# Patient Record
Sex: FEMALE | Race: WHITE | NOT HISPANIC OR LATINO | Employment: FULL TIME | URBAN - METROPOLITAN AREA
[De-identification: names, ages, dates, MRNs, and addresses within clinical notes are randomized per-mention and may not be internally consistent; named-entity substitution may affect disease eponyms.]

---

## 2017-01-30 ENCOUNTER — OFFICE VISIT - HEALTHEAST (OUTPATIENT)
Dept: FAMILY MEDICINE | Facility: CLINIC | Age: 37
End: 2017-01-30

## 2017-01-30 DIAGNOSIS — E28.2 POLYCYSTIC OVARIAN SYNDROME: ICD-10-CM

## 2017-01-30 DIAGNOSIS — E66.01 MORBID OBESITY WITH BMI OF 40.0-44.9, ADULT (H): ICD-10-CM

## 2017-01-30 DIAGNOSIS — Z00.00 ROUTINE GENERAL MEDICAL EXAMINATION AT A HEALTH CARE FACILITY: ICD-10-CM

## 2017-01-30 DIAGNOSIS — F42.9 OBSESSIVE-COMPULSIVE DISORDER: ICD-10-CM

## 2017-01-30 DIAGNOSIS — F33.0 MAJOR DEPRESSIVE DISORDER, RECURRENT EPISODE, MILD (H): ICD-10-CM

## 2017-01-30 DIAGNOSIS — M62.830 MUSCLE SPASM OF BACK: ICD-10-CM

## 2017-01-30 DIAGNOSIS — F41.1 GENERALIZED ANXIETY DISORDER: ICD-10-CM

## 2017-01-30 ASSESSMENT — MIFFLIN-ST. JEOR: SCORE: 2049.91

## 2017-03-19 ENCOUNTER — COMMUNICATION - HEALTHEAST (OUTPATIENT)
Dept: FAMILY MEDICINE | Facility: CLINIC | Age: 37
End: 2017-03-19

## 2017-03-19 DIAGNOSIS — F33.0 MAJOR DEPRESSIVE DISORDER, RECURRENT EPISODE, MILD (H): ICD-10-CM

## 2017-03-19 DIAGNOSIS — F41.1 GENERALIZED ANXIETY DISORDER: ICD-10-CM

## 2017-03-19 DIAGNOSIS — F42.9 OBSESSIVE-COMPULSIVE DISORDER: ICD-10-CM

## 2018-02-03 ENCOUNTER — COMMUNICATION - HEALTHEAST (OUTPATIENT)
Dept: FAMILY MEDICINE | Facility: CLINIC | Age: 38
End: 2018-02-03

## 2018-02-03 DIAGNOSIS — E28.2 POLYCYSTIC OVARIAN SYNDROME: ICD-10-CM

## 2018-06-29 ENCOUNTER — COMMUNICATION - HEALTHEAST (OUTPATIENT)
Dept: FAMILY MEDICINE | Facility: CLINIC | Age: 38
End: 2018-06-29

## 2018-06-29 DIAGNOSIS — F33.0 MAJOR DEPRESSIVE DISORDER, RECURRENT EPISODE, MILD (H): ICD-10-CM

## 2018-06-29 DIAGNOSIS — F41.1 GENERALIZED ANXIETY DISORDER: ICD-10-CM

## 2018-06-29 DIAGNOSIS — F42.9 OBSESSIVE-COMPULSIVE DISORDER: ICD-10-CM

## 2018-08-16 ENCOUNTER — OFFICE VISIT - HEALTHEAST (OUTPATIENT)
Dept: FAMILY MEDICINE | Facility: CLINIC | Age: 38
End: 2018-08-16

## 2018-08-16 DIAGNOSIS — Z00.00 ROUTINE GENERAL MEDICAL EXAMINATION AT A HEALTH CARE FACILITY: ICD-10-CM

## 2018-08-16 DIAGNOSIS — E28.2 POLYCYSTIC OVARIAN SYNDROME: ICD-10-CM

## 2018-08-16 DIAGNOSIS — N91.1 SECONDARY AMENORRHEA: ICD-10-CM

## 2018-08-16 DIAGNOSIS — F42.9 OBSESSIVE-COMPULSIVE DISORDER: ICD-10-CM

## 2018-08-16 DIAGNOSIS — F41.1 GENERALIZED ANXIETY DISORDER: ICD-10-CM

## 2018-08-16 DIAGNOSIS — E66.01 MORBID OBESITY WITH BMI OF 40.0-44.9, ADULT (H): ICD-10-CM

## 2018-08-16 DIAGNOSIS — M62.830 MUSCLE SPASM OF BACK: ICD-10-CM

## 2018-08-16 DIAGNOSIS — F33.0 MAJOR DEPRESSIVE DISORDER, RECURRENT EPISODE, MILD (H): ICD-10-CM

## 2018-08-16 DIAGNOSIS — L30.9 ECZEMA: ICD-10-CM

## 2018-08-16 LAB
CHOLEST SERPL-MCNC: 215 MG/DL
FASTING STATUS PATIENT QL REPORTED: YES
HBA1C MFR BLD: 5.3 % (ref 3.5–6)
HDLC SERPL-MCNC: 44 MG/DL
LDLC SERPL CALC-MCNC: 139 MG/DL
TRIGL SERPL-MCNC: 159 MG/DL

## 2018-08-16 RX ORDER — TRIAMCINOLONE ACETONIDE 1 MG/G
CREAM TOPICAL 2 TIMES DAILY PRN
Qty: 80 G | Refills: 1 | Status: SHIPPED | OUTPATIENT
Start: 2018-08-16 | End: 2022-07-02

## 2018-08-16 ASSESSMENT — MIFFLIN-ST. JEOR: SCORE: 2044.81

## 2019-04-08 ENCOUNTER — COMMUNICATION - HEALTHEAST (OUTPATIENT)
Dept: FAMILY MEDICINE | Facility: CLINIC | Age: 39
End: 2019-04-08

## 2019-09-29 ENCOUNTER — COMMUNICATION - HEALTHEAST (OUTPATIENT)
Dept: FAMILY MEDICINE | Facility: CLINIC | Age: 39
End: 2019-09-29

## 2019-09-29 DIAGNOSIS — F41.1 GENERALIZED ANXIETY DISORDER: ICD-10-CM

## 2019-09-29 DIAGNOSIS — F42.9 OBSESSIVE-COMPULSIVE DISORDER: ICD-10-CM

## 2019-09-29 DIAGNOSIS — F33.0 MAJOR DEPRESSIVE DISORDER, RECURRENT EPISODE, MILD (H): ICD-10-CM

## 2019-11-11 ENCOUNTER — COMMUNICATION - HEALTHEAST (OUTPATIENT)
Dept: FAMILY MEDICINE | Facility: CLINIC | Age: 39
End: 2019-11-11

## 2019-11-11 DIAGNOSIS — E28.2 POLYCYSTIC OVARIAN SYNDROME: ICD-10-CM

## 2019-12-27 ENCOUNTER — OFFICE VISIT - HEALTHEAST (OUTPATIENT)
Dept: FAMILY MEDICINE | Facility: CLINIC | Age: 39
End: 2019-12-27

## 2019-12-27 DIAGNOSIS — Z12.31 VISIT FOR SCREENING MAMMOGRAM: ICD-10-CM

## 2019-12-27 DIAGNOSIS — E66.9 OBESITY (BMI 35.0-39.9 WITHOUT COMORBIDITY): ICD-10-CM

## 2019-12-27 DIAGNOSIS — F32.5 DEPRESSION, MAJOR, IN REMISSION (H): ICD-10-CM

## 2019-12-27 DIAGNOSIS — E66.01 MORBID OBESITY WITH BMI OF 40.0-44.9, ADULT (H): ICD-10-CM

## 2019-12-27 DIAGNOSIS — Z00.00 ROUTINE GENERAL MEDICAL EXAMINATION AT A HEALTH CARE FACILITY: ICD-10-CM

## 2019-12-27 DIAGNOSIS — F42.9 OBSESSIVE-COMPULSIVE DISORDER: ICD-10-CM

## 2019-12-27 DIAGNOSIS — F41.1 GENERALIZED ANXIETY DISORDER: ICD-10-CM

## 2019-12-27 DIAGNOSIS — Z12.4 CERVICAL CANCER SCREENING: ICD-10-CM

## 2019-12-27 DIAGNOSIS — F42.9 OBSESSIVE-COMPULSIVE DISORDER, UNSPECIFIED TYPE: ICD-10-CM

## 2019-12-27 DIAGNOSIS — E28.2 POLYCYSTIC OVARIAN SYNDROME: ICD-10-CM

## 2019-12-27 DIAGNOSIS — Z00.00 HEALTH CARE MAINTENANCE: ICD-10-CM

## 2019-12-27 DIAGNOSIS — E78.5 DYSLIPIDEMIA (HIGH LDL; LOW HDL): ICD-10-CM

## 2019-12-27 DIAGNOSIS — F33.0 MAJOR DEPRESSIVE DISORDER, RECURRENT EPISODE, MILD (H): ICD-10-CM

## 2019-12-27 LAB
CHOLEST SERPL-MCNC: 209 MG/DL
CLUE CELLS: NORMAL
FASTING STATUS PATIENT QL REPORTED: ABNORMAL
HBA1C MFR BLD: 5.2 % (ref 3.5–6)
HDLC SERPL-MCNC: 55 MG/DL
LDLC SERPL CALC-MCNC: 121 MG/DL
TRICHOMONAS, WET PREP: NORMAL
TRIGL SERPL-MCNC: 167 MG/DL
YEAST, WET PREP: NORMAL

## 2019-12-27 ASSESSMENT — PATIENT HEALTH QUESTIONNAIRE - PHQ9: SUM OF ALL RESPONSES TO PHQ QUESTIONS 1-9: 2

## 2019-12-27 ASSESSMENT — MIFFLIN-ST. JEOR: SCORE: 1895.69

## 2019-12-30 LAB
C TRACH DNA SPEC QL PROBE+SIG AMP: NEGATIVE
HBV SURFACE AG SERPL QL IA: NEGATIVE
HEPATITIS B SURFACE ANTIBODY LHE- HISTORICAL: NEGATIVE
HPV SOURCE: NORMAL
HUMAN PAPILLOMA VIRUS 16 DNA: NEGATIVE
HUMAN PAPILLOMA VIRUS 18 DNA: NEGATIVE
HUMAN PAPILLOMA VIRUS FINAL DIAGNOSIS: NORMAL
HUMAN PAPILLOMA VIRUS OTHER HR: NEGATIVE
N GONORRHOEA DNA SPEC QL NAA+PROBE: NEGATIVE
SPECIMEN DESCRIPTION: NORMAL

## 2019-12-31 ENCOUNTER — COMMUNICATION - HEALTHEAST (OUTPATIENT)
Dept: FAMILY MEDICINE | Facility: CLINIC | Age: 39
End: 2019-12-31

## 2020-02-12 ENCOUNTER — VIRTUAL VISIT (OUTPATIENT)
Dept: FAMILY MEDICINE | Facility: OTHER | Age: 40
End: 2020-02-12

## 2020-02-13 NOTE — PROGRESS NOTES
"Date: 2020 17:43:12  Clinician: Will Ervin  Clinician NPI: [id number]  Patient: ADAN PEREZ  Patient : 1980  Patient Address: [facility] [place] [address]  Patient Phone: [phone]  Visit Protocol: URI  Patient Summary:  ADAN is a 39 year old ( : 1980 ) female who initiated a Visit for cold, sinus infection, or influenza. When asked the question \"Please sign me up to receive news, health information and promotions from [employer].\", ADAN responded \"No\".    ADAN states her symptoms started gradually 7-9 days ago.   Her symptoms consist of a cough, facial pain or pressure, ear pain, enlarged lymph nodes, malaise, nasal congestion, and tooth pain.   Symptom details     Nasal secretions: The color of her mucus is green, clear, and yellow.    Cough: ADAN coughs every 5-10 minutes and her cough is more bothersome at night. Phlegm comes into her throat when she coughs. She believes the phlegm causes the cough. The color of the phlegm is yellow.     Facial pain or pressure: The facial pain or pressure feels worse when bending over or leaning forward.     Tooth pain: The tooth pain is not caused by a cavity, recent dental work, or other mouth problems.      ADAN denies having myalgias, headache, sore throat, chills, fever, rhinitis, and wheezing. She also denies having recent facial or sinus surgery in the past 60 days, double sickening (worsening symptoms after initial improvement), and taking antibiotic medication for the symptoms. She is not experiencing dyspnea.   Precipitating events  She has not recently been exposed to someone with influenza. ADAN has not been in close contact with any high risk individuals.   ADAN has not traveled internationally in the last 14 days before the start of her symptoms.   Pertinent medical history  ADAN had 1 sinus infection within the past year.   ADAN does not get yeast infections when she takes antibiotics.   Weight: 250 lbs   ADAN does not smoke " or use smokeless tobacco.   She denies pregnancy and denies breastfeeding. She is currently menstruating.   Weight: 250 lbs    MEDICATIONS: levonorgestrel-ethinyl estradiol oral, sertraline oral, ALLERGIES: NKDA  Clinician Response:  Dear ADAN,  Based on the information provided, you have a viral upper respiratory infection, otherwise known as a cold. Symptoms vary from person to person, but can include sneezing, coughing, a runny nose, sore throat, and headache and range from mild to severe.  Unfortunately, there are no medications that can cure a cold, so treatment is focused on controlling symptoms as much as possible. Most people gradually feel better until symptoms are gone in 1-2 weeks.  Based on the information provided, you have acute bacterial sinusitis, also known as a sinus infection. Sinus infections are caused by bacteria or a virus and symptoms are almost always identical. The difference between the 2 types of infections is timing.  Sinus infections start as viral infections and symptoms improve on their own in about 7 days. If symptoms have not improved after 7 days or have even worsened, a bacterial infection may have developed.  Medication information  Because you have a viral infection, antibiotics will not help you get better. Treating a viral infection with antibiotics could actually make you feel worse.  I am prescribing:       Fluticasone 50 mcg/actuation nasal spray. Inhale 2 sprays in each nostril 1 time per day; after 1 week, may adjust to 1 - 2 sprays in each nostril 1 time per day. This medication takes several days to start working, so keep taking it even if it doesn't help right away. There are no refills with this prescription.      Amoxicillin-pot clavulanate 875-125 mg oral tablet. Take 1 tablet by mouth every 12 hours for 7 days. There are no refills with this prescription.      Benzonatate (Tessalon Perles) 100 mg oral capsule. Take 1-2 capsules by mouth 3 times per day as needed  for your cough. There are no refills with this prescription.     Yeast infections can be a common side effect of antibiotics. The most common symptom of a yeast infection is itchiness in and around the vagina. Other signs and symptoms include burning, redness, or a thick, white vaginal discharge that looks like cottage cheese and does not have a bad smell.  If you become pregnant during this course of treatment, stop taking the medication and contact your primary care provider.  Unless you are allergic to the over-the-counter medication(s) below, I recommend using:       Guaifenesin + dextromethorphan (Robitussin DM, Mucinex DM, or store brand).    A sinus irrigation kit such as Sinus Rinse, Neti Pot, SinuCleanse, or store brand. Be sure to use sterile or previously boiled water to prevent unwanted infections.     Over-the-counter medications do not require a prescription. Ask the pharmacist if you have any questions.  Self care  The following tips will keep you as comfortable as possible while you recover:     Rest    Drink plenty of water and other liquids    Take a hot shower to loosen congestion    Take a spoonful of honey to reduce your cough     When to seek care  Please be seen in a clinic or urgent care if any of the following occur:     Symptoms do not start to improve after 3 days of treatment    New symptoms develop, or symptoms become worse     It is possible to have an allergic reaction to an antibiotic even if you have not had one in the past. If you notice a new rash, significant swelling, or difficulty breathing, stop taking this medication immediately and go to a clinic or urgent care.   Diagnosis: Viral URI  Diagnosis ICD: J06.9  Prescription: fluticasone 50 mcg/actuation nasal spray,suspension 1 120 spray aerosol with adapter (grams), 30 days supply. Inhale 2 sprays in each nostril 1 time per day; after 1 week, may adjust to 1 - 2 sprays in each nostril 1 time per day.. Refills: 0, Refill as  needed: no, Allow substitutions: yes  Prescription: benzonatate (Tessalon Perles) 100 mg oral capsule 30 capsule, 5 days supply. Take 1-2 capsules by mouth 3 times per day as needed. Refills: 0, Refill as needed: no, Allow substitutions: yes  Prescription: amoxicillin-pot clavulanate 875-125 mg oral tablet 14 tablet, 7 days supply. Take 1 tablet by mouth every 12 hours for 7 days. Refills: 0, Refill as needed: no, Allow substitutions: yes  Pharmacy: [id number] - [phone] - [address], [place] [address]

## 2020-02-26 ENCOUNTER — HOSPITAL ENCOUNTER (OUTPATIENT)
Dept: MAMMOGRAPHY | Facility: CLINIC | Age: 40
Discharge: HOME | End: 2020-02-26
Attending: FAMILY MEDICINE

## 2020-02-26 DIAGNOSIS — Z12.31 VISIT FOR SCREENING MAMMOGRAM: ICD-10-CM

## 2021-02-03 ENCOUNTER — COMMUNICATION - HEALTHEAST (OUTPATIENT)
Dept: FAMILY MEDICINE | Facility: CLINIC | Age: 41
End: 2021-02-03

## 2021-02-03 DIAGNOSIS — E28.2 POLYCYSTIC OVARIAN SYNDROME: ICD-10-CM

## 2021-02-04 RX ORDER — LEVONORGESTREL AND ETHINYL ESTRADIOL 0.15-0.03
KIT ORAL
Qty: 91 TABLET | Refills: 4 | Status: SHIPPED | OUTPATIENT
Start: 2021-02-04 | End: 2022-03-22

## 2021-03-25 ENCOUNTER — COMMUNICATION - HEALTHEAST (OUTPATIENT)
Dept: FAMILY MEDICINE | Facility: CLINIC | Age: 41
End: 2021-03-25

## 2021-03-25 DIAGNOSIS — F33.0 MAJOR DEPRESSIVE DISORDER, RECURRENT EPISODE, MILD (H): ICD-10-CM

## 2021-03-25 DIAGNOSIS — F42.9 OBSESSIVE-COMPULSIVE DISORDER: ICD-10-CM

## 2021-03-25 DIAGNOSIS — F41.1 GENERALIZED ANXIETY DISORDER: ICD-10-CM

## 2021-03-26 RX ORDER — SERTRALINE HYDROCHLORIDE 100 MG/1
TABLET, FILM COATED ORAL
Qty: 180 TABLET | Refills: 4 | Status: SHIPPED | OUTPATIENT
Start: 2021-03-26 | End: 2022-06-14

## 2021-04-05 ENCOUNTER — COMMUNICATION - HEALTHEAST (OUTPATIENT)
Dept: FAMILY MEDICINE | Facility: CLINIC | Age: 41
End: 2021-04-05

## 2021-05-26 ASSESSMENT — PATIENT HEALTH QUESTIONNAIRE - PHQ9: SUM OF ALL RESPONSES TO PHQ QUESTIONS 1-9: 2

## 2021-05-30 ENCOUNTER — RECORDS - HEALTHEAST (OUTPATIENT)
Dept: ADMINISTRATIVE | Facility: CLINIC | Age: 41
End: 2021-05-30

## 2021-05-30 VITALS — HEIGHT: 69 IN | BODY MASS INDEX: 43.25 KG/M2 | WEIGHT: 292 LBS

## 2021-06-01 VITALS — HEIGHT: 69 IN | WEIGHT: 290 LBS | BODY MASS INDEX: 42.95 KG/M2

## 2021-06-01 NOTE — TELEPHONE ENCOUNTER
RN cannot approve Refill Request    RN can NOT refill this medication PCP messaged that patient is overdue for Office Visit. Last office visit: 11/10/2015 Brooke Pal MD Last Physical: 8/16/2018 Last MTM visit: Visit date not found Last visit same specialty: 11/10/2015 Brooke Pal MD.  Next visit within 3 mo: Visit date not found  Next physical within 3 mo: Visit date not found      Faiza Maynard, [facility] Triage/Med Refill 9/29/2019    Requested Prescriptions   Pending Prescriptions Disp Refills     sertraline (ZOLOFT) 100 MG tablet [Pharmacy Med Name: SERTRALINE  MG TABLET] 180 tablet 4     Sig: TAKE 2 TABLETS BY MOUTH EVERY DAY       [facility] Refill Protocol  Failed - 9/29/2019 12:29 AM        Failed - PCP or prescribing provider visit in last year     Last office visit with prescriber/PCP: 11/10/2015 Brooke Pal MD OR same dept: Visit date not found OR same specialty: 11/10/2015 Brooke Pal MD  Last physical: 8/16/2018 Last MTM visit: Visit date not found   Next visit within 3 mo: Visit date not found  Next physical within 3 mo: Visit date not found  Prescriber OR PCP: Brooke Pal MD  Last diagnosis associated with med order: 1. Obsessive-compulsive disorder  - sertraline (ZOLOFT) 100 MG tablet [Pharmacy Med Name: SERTRALINE  MG TABLET]; TAKE 2 TABLETS BY MOUTH EVERY DAY  Dispense: 180 tablet; Refill: 4    2. Mild Recurrent Major Depression  - sertraline (ZOLOFT) 100 MG tablet [Pharmacy Med Name: SERTRALINE  MG TABLET]; TAKE 2 TABLETS BY MOUTH EVERY DAY  Dispense: 180 tablet; Refill: 4    3. Generalized anxiety disorder  - sertraline (ZOLOFT) 100 MG tablet [Pharmacy Med Name: SERTRALINE  MG TABLET]; TAKE 2 TABLETS BY MOUTH EVERY DAY  Dispense: 180 tablet; Refill: 4    If protocol passes may refill for 12 months if within 3 months of last provider visit (or a total of 15 months).

## 2021-06-02 NOTE — TELEPHONE ENCOUNTER
Left message #1 at [phone] for patient to call clinic to schedule annual physical with PCP. Postponing task out to a week and will try again.

## 2021-06-03 NOTE — TELEPHONE ENCOUNTER
RN cannot approve Refill Request    RN can NOT refill this medication PCP messaged that patient is overdue for Office Visit. Last office visit: 11/10/2015 Brooke Pal MD Last Physical: 8/16/2018 Last MTM visit: Visit date not found Last visit same specialty: 11/10/2015 Brooke Pal MD.  Next visit within 3 mo: Visit date not found  Next physical within 3 mo: Visit date not found      Ruby Gu, [facility] Triage/Med Refill 11/11/2019    Requested Prescriptions   Pending Prescriptions Disp Refills     levonorgestrel-ethinyl estradiol (SEASONALE) 0.15 mg-30 mcg (91) per tablet [Pharmacy Med Name: LEVONOR-ETH ESTRAD 0.15-0.03] 91 tablet 3     Sig: TAKE 1 TABLET BY MOUTH EVERY DAY       Oral Contraceptives Protocol Failed - 11/11/2019 12:34 PM        Failed - Visit with PCP or prescribing provider visit in last 12 months      Last office visit with prescriber/PCP: 11/10/2015 Brooke Pal MD OR same dept: Visit date not found OR same specialty: 11/10/2015 Brooke Pal MD  Last physical: 8/16/2018 Last MTM visit: Visit date not found   Next visit within 3 mo: Visit date not found  Next physical within 3 mo: Visit date not found  Prescriber OR PCP: Brooke Pal MD  Last diagnosis associated with med order: 1. Polycystic ovarian syndrome  - levonorgestrel-ethinyl estradiol (SEASONALE) 0.15 mg-30 mcg (91) per tablet [Pharmacy Med Name: LEVONOR-ETH ESTRAD 0.15-0.03]; TAKE 1 TABLET BY MOUTH EVERY DAY  Dispense: 91 tablet; Refill: 3    If protocol passes may refill for 12 months if within 3 months of last provider visit (or a total of 15 months).

## 2021-06-03 NOTE — TELEPHONE ENCOUNTER
Future Appointments   Date Time Provider Department Center   12/27/2019  1:00 PM Brooke Pal MD [facility]      Health Maintenance Due   Topic Date Due     DEPRESSION FOLLOW UP  1980     PAP SMEAR  07/08/2019     HPV TEST  07/08/2019     INFLUENZA VACCINE RULE BASED (1) 08/01/2019     PREVENTIVE CARE VISIT  08/16/2019

## 2021-06-04 VITALS
HEART RATE: 70 BPM | DIASTOLIC BLOOD PRESSURE: 72 MMHG | RESPIRATION RATE: 16 BRPM | WEIGHT: 258 LBS | SYSTOLIC BLOOD PRESSURE: 108 MMHG | BODY MASS INDEX: 38.21 KG/M2 | HEIGHT: 69 IN | TEMPERATURE: 97.9 F

## 2021-06-04 NOTE — PATIENT INSTRUCTIONS - HE
Cholesterol  Cooking oils: avocado or grapeseed  Raw: olive  Eat less animal fat and MORE plant fats (including nuts).  Eat more whole grains.

## 2021-06-04 NOTE — PROGRESS NOTES
Health Maintenance Exam  [facility]  Date of Service: 12/27/2019    Subjective   Germania Cheung is a 39 y.o. female who presents for a routine physical exam.     Current concerns include the following:    No concerns.  Needs refills on chronic medications.  She thinks she may not be immune to hepatitis B.  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      The 10-year ASCVD risk score (Demondabdulkadir MICHEL Jr., et al., 2013) is: 0.7%    Values used to calculate the score:      Age: 39 years      Sex: Female      Is Non- : No      Diabetic: No      Tobacco smoker: No      Systolic Blood Pressure: 108 mmHg      Is BP treated: No      HDL Cholesterol: 44 mg/dL      Total Cholesterol: 215 mg/dL      Active Non-Hospital Problems    Diagnosis     Obesity (BMI 35.0-39.9 without comorbidity)     Dyslipidemia (high LDL; low HDL)     2018       Polycystic ovarian syndrome     Secondary amenorrhea and signs of hyperandrogenism (acne and hair growth).  Hormone evaluation otherwise negative.  Has not had pelvic ultrasound.       Secondary amenorrhea     Muscle spasm of back     Depression, major, in remission (H)     Generalized anxiety disorder     Obsessive Compulsive Disorder     Eczema     Past Medical History:   Diagnosis Date     Nicotine dependence     7 pack years     Postpartum depression       Past Surgical History:   Procedure Laterality Date     FINGER GANGLION CYST EXCISION Left 2009    Third finger MCP joint     LAPAROSCOPIC CHOLECYSTECTOMY  2000     TONSILLECTOMY  1981      Current Outpatient Medications   Medication Sig Dispense Refill     levonorgestrel-ethinyl estradiol (SEASONALE) 0.15 mg-30 mcg (91) per tablet Take 1 tablet by mouth daily. 84 tablet 4     sertraline (ZOLOFT) 100 MG tablet Take 2 tablets (200 mg total) by mouth daily. 180 tablet 4     triamcinolone (KENALOG) 0.1 % cream Apply topically 2 (two) times a day  as needed. For eczema. Limit to less than 14 days. 80 g 1     No current facility-administered medications for this visit.       No Known Allergies   Social History     Socioeconomic History     Marital status:      Spouse name: Kyle     Number of children: 1     Years of education: Masters Degree     Highest education level: Not on file   Occupational History     Occupation: , family [employer]   Social Needs     Financial resource strain: Not on file     Food insecurity:     Worry: Not on file     Inability: Not on file     Transportation needs:     Medical: Not on file     Non-medical: Not on file   Tobacco Use     Smoking status: Former Smoker     Years: 8.00     Types: Cigarettes     Smokeless tobacco: Never Used   Substance and Sexual Activity     Alcohol use: Yes     Comment: 2-3 drinks per week     Drug use: No     Sexual activity: Yes     Partners: Male     Birth control/protection: Pill     Comment: on OCP for endometrial protection (PCOS->secondary amenorrhea)   Lifestyle     Physical activity:     Days per week: Not on file     Minutes per session: Not on file     Stress: Not on file   Relationships     Social connections:     Talks on phone: Not on file     Gets together: Not on file     Attends Mu-ism service: Not on file     Active member of club or organization: Not on file     Attends meetings of clubs or organizations: Not on file     Relationship status: Not on file     Intimate partner violence:     Fear of current or ex partner: Not on file     Emotionally abused: Not on file     Physically abused: Not on file     Forced sexual activity: Not on file   Other Topics Concern     Not on file   Social History Narrative    : Kyle. Daugher: Lili.    Eats fruits, veggies. Excess calories in evenings.    Exercise: walking dog, elliptical, light weights. Gym 3x/wk.    Wears seatbelts, helmets, and sunscreen.      Family History   Problem Relation Age of Onset      "Alcohol abuse Paternal Grandfather      Emphysema Paternal Grandfather         smoker     Lung cancer Paternal Grandfather      Hypertension Paternal Grandfather      Breast cancer Paternal Aunt 40        has had twice     Hypertension Father      Other Brother         Hypoglycemia      Other Mother         Menorrhagia     Emphysema Maternal Grandfather         smoker     Lung cancer Maternal Grandfather      Brain cancer Paternal Grandmother          young     No Medical Problems Maternal Grandmother      No Medical Problems Daughter       REVIEW OF SYSTEMS: negative, except as listed in subjective above.    Physical Exam   /72 (Patient Site: Left Arm, Patient Position: Sitting, Cuff Size: Adult Large)   Pulse 70   Temp 97.9  F (36.6  C) (Oral)   Resp 16   Ht 5' 8.75\" (1.746 m)   Wt (!) 258 lb (117 kg)   BMI 38.38 kg/m     Social History     Tobacco Use   Smoking Status Former Smoker     Years: 8.00     Types: Cigarettes   Smokeless Tobacco Never Used     General: Alert, NAD.  Head: NC/AT.  Ears: Normal canal, pinnae and tympanic membrane.  Eyes: PERRL, normal fundi.  Nose: Normal mucosa.  Mouth: Adequate dentition, normal throat.  Neck: normal thyroid, midline trachea.  Breasts: no masses, skin changes, nipple discharge or tenderness.  Lungs: CTA bilaterally, no increased work of breathing.  Heart: RRR, no m/r/g  Abdomen: soft, nt/nd, BS+  Genitourinary: Normal vulva, normal vaginal mucosa, cervix normal appearance. No cervical or adnexal tenderness. No adnexal fullness.  Musculoskelatal: No significant deformity.  Skin: no atypical lesion or rash.  Lymphatics: no lymphadenopathy.   Psych: normal affect.    Recent Results (from the past 24 hour(s))   Glycosylated Hemoglobin A1c   Result Value Ref Range    Hemoglobin A1c 5.2 3.5 - 6.0 %   Wet Prep, Vaginal   Result Value Ref Range    Yeast Result No yeast seen No yeast seen    Trichomonas No Trichomonas seen No Trichomonas seen    Clue Cells, Wet " Prep No Clue cells seen No Clue cells seen     No results found.    Assessment & Plan   1. Health Maintenance  o Cancer screening: pap + mammo ordered  o Bone Health: Discussed Calcium, vitamin D, and weight bearing exercise.  o Immunizations: Reviewed and Updated today.  2. Obesity. Body mass index is 38.38 kg/m ..  Continue modification of diet and exercise.  Check hemoglobin A1c and lipids.  3. Reproductive plans: Not planning pregnancy. Contraception: OCP refilled.  Patient requested screening for gonorrhea, chlamydia, and wet prep today.  4. Advance directive: form given I have had an Advance Directives discussion with the patient.   5. Possible exposure to blood-borne pathogen.  Check hepatitis B surface antigen and surface antibody.    Order Summary                                                      1. Routine general medical examination at a health care facility  Chlamydia trachomatis & Neisseria gonorrhoeae, Amplified Detection    Wet Prep, Vaginal   2. Morbid obesity with BMI of 40.0-44.9, adult (H)  Glycosylated Hemoglobin A1c   3. Polycystic ovarian syndrome  levonorgestrel-ethinyl estradiol (SEASONALE) 0.15 mg-30 mcg (91) per tablet   4. Dyslipidemia (high LDL; low HDL)  Lipid Cascade   5. Obsessive-compulsive disorder, unspecified type     6. Generalized anxiety disorder  sertraline (ZOLOFT) 100 MG tablet   7. Depression, major, in remission (H)     8. Health care maintenance  Influenza, Seasonal Quad, PF =/> 6months    Hepatitis B Surface Antigen (HBsAG)    Hepatitis B Surface Antibody (Anti-HBs)   9. Cervical cancer screening  Gynecologic Cytology (PAP Smear)   10. Visit for screening mammogram  Mammo Screening Bilateral   11. Obsessive-compulsive disorder  sertraline (ZOLOFT) 100 MG tablet   12. Mild Recurrent Major Depression  sertraline (ZOLOFT) 100 MG tablet   13. Obesity (BMI 35.0-39.9 without comorbidity)        Future Appointments   Date Time Provider Department Center   2/26/2020  4:15 PM  [facility]       Completed by: Brooke Pal M.D., [facility]. 12/27/2019 1:15 PM.  This transcription uses voice recognition software, which may contain typographical errors.

## 2021-06-08 NOTE — PROGRESS NOTES
SUBJECTIVE:   Germania Cheung is a 36 y.o. female who presents for a routine physical exam.     Current concerns include the following:     No menses for a year. Previous workup: Positive progestin challenge. NO contraception. If got pregnant would be OK, not planning pregnancy. Doesn't think she can get pregnant.  She is okay with going on contraception for endometrial and bone health.    Weight: was losing. Derailed by holidays. Back pain is getting in the way.  Discussed medical nutrition therapy.  The patient is not interested at this time.    Back pain: Started with pregnancy with Reva. 20 years ago would get sore at times. Progressively worse.  A couple of years ago, she saw chiropractor and that helped relieve the pain.  Now, she has had a couple of days where she has been laying on the floor due to the pain.  Symptoms are improved with ice and ibuprofen.  She gets an exacerbation of back pain about every year.  She denies any bowel or bladder changes, saddle anesthesia, lower extremity weakness.  The pain really does not radiate into the legs at all.  When it is really bad occasionally it will go into the right buttock.  No midline back pain.  Wonders about muscle relaxer.    Depression and anxiety stable, wants med refill.    Bump on left scalp.    Patient Active Problem List    Diagnosis Date Noted     Morbid obesity with BMI of 40.0-44.9, adult      Priority: High     Polycystic ovarian syndrome 01/31/2017     Priority: Medium     Overview Note:     Secondary amenorrhea and signs of hyperandrogenism (acne and hair growth).  Hormone evaluation otherwise negative.  Has not had pelvic ultrasound.       Muscle spasm of back 01/30/2017     Priority: Medium     Secondary amenorrhea 11/24/2015     Priority: Medium     Mild Recurrent Major Depression      Priority: Low     Generalized anxiety disorder      Priority: Low     Obsessive Compulsive Disorder      Priority: Low     Eczema      Priority: Low     Past  Medical History   Diagnosis Date     Nicotine dependence      7 pack years     Postpartum depression       Past Surgical History   Procedure Laterality Date     Finger ganglion cyst excision Left 2009     Third finger MCP joint     Laparoscopic cholecystectomy  2000     Tonsillectomy  1981      Current Outpatient Prescriptions   Medication Sig Dispense Refill     sertraline (ZOLOFT) 100 MG tablet Take 1 tablet (100 mg total) by mouth daily. 180 tablet 4     triamcinolone (KENALOG) 0.1 % cream Apply topically 2 (two) times a day as needed. For eczema. Limit to less than 14 days. 45 g 1     cyclobenzaprine (FLEXERIL) 10 MG tablet Take 1 tablet (10 mg total) by mouth bedtime as needed for muscle spasms. 10 tablet 0     levonorgestrel-ethinyl estradiol (SEASONALE) 0.15 mg-30 mcg per tablet Take 1 tablet by mouth daily. 1 Package 4     No current facility-administered medications for this visit.       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 Bakarier: Lili.    Eats fruits, veggies. Excess calories in evenings.    Exercise: walking, elliptical, light weights.    Wears seatbelts, helmets, and sunscreen.      Family History   Problem Relation Age of Onset     Alcohol abuse Paternal Grandfather      Emphysema Paternal Grandfather      smoker     Lung cancer Paternal Grandfather       "Hypertension Paternal Grandfather      Breast cancer Paternal Aunt 40     Hypertension Father      Other Brother      Hypoglycemia      Other Mother      Menorrhagia     Emphysema Maternal Grandfather      smoker     Lung cancer Maternal Grandfather      Brain cancer Paternal Grandmother       REVIEW OF SYSTEMS: negative, except as listed in subjective above.    PHYSICAL EXAM:   Visit Vitals     /76 (Patient Site: Right Arm, Patient Position: Sitting, Cuff Size: Adult Large)     Pulse 70     Temp 97.1  F (36.2  C) (Oral)     Resp 22     Ht 5' 8.75\" (1.746 m)     Wt (!) 292 lb (132.5 kg)     BMI 43.44 kg/m2      GENERAL: Alert, NAD. Obesity  HEAD: NC/AT.  EARS: Normal canal, pinnae and tympanic membrane.  EYES: PERRL, normal fundi.  NOSE: Normal mucosa.  MOUTH: Adequate dentition, normal throat.  NECK: normal thyroid, midline trachea.  BREASTS: no masses, skin changes, nipple discharge or tenderness.  LUNGS: CTA bilaterally, no increased work of breathing.  HEART: RRR, no m/r/g  ABDOMEN: soft, nt/nd, BS+  : Declined  MSK: No significant deformity.  SKIN: no atypical lesion or rash. 1 cm firm/rubbery cystic bump over left ear on scalp.  LYMPHATICS: no lymphadenopathy.   PSYCH: normal affect.    Office Visit on 11/10/2015   Component Date Value Ref Range Status     FSH 11/10/2015 3.3  mIU/mL Final      Females:     Prepubertal     0-10 mIU/mL    Follicular      3-20 mIU/mL    Luteal          0-12 mIU/mL    Ovulatory       9-26 mIU/mL    Postmenopausal   mIU/mL     TSH 11/10/2015 1.41  0.30 - 5.00 uIU/mL Final     Prolactin 11/10/2015 5.8  0.0 - 20.0 ng/mL Final     Testosterone, Free 11/10/2015 <0.30  <0.30 - 0.97 ng/dL Final       -------------------ADDITIONAL INFORMATION-------------------  Testing performed by [facility].     Testosterone, Total 11/10/2015 13  8 - 60 ng/dL Final       -------------------ADDITIONAL INFORMATION-------------------  Testing performed by Liquid " Chromatography-Tandem Mass   Spectrometry (LC-MS/MS).     Test Performed by:  [facility] [place] [address]  : Will Anthony II, M.D., Ph.D.     Dehydroepiandrosterone Sulfate 11/10/2015 177  31 - 228 mcg/dL Final       Test Performed by:  [facility] [place] [address]  : Will Anthony II, M.D., Ph.D.     17-Hydroxyprogesterone 11/10/2015 <40  ng/dL Final       -------------------REFERENCE VALUE--------------------------  < 80 (Follicular)  <285 (Luteal)     Test Performed by:  [place] [address]  : Will Anthony II, M.D., Ph.D.   Lab on 07/20/2015   Component Date Value Ref Range Status     TSH 07/20/2015 1.39  0.30 - 5.05 uIU/mL Final     Hemoglobin 07/20/2015 13.6  12.0 - 16.0 g/dL Final   Orders Only on 07/08/2014                    Value:Gynecologic Cytology Report                       Case: [id number]                                   --------------------------------------------------------------------------------------------------  Authorizing Provider:  Brooke Pal MD         Ordering Provider:                                Ordering Location:     [facility]  Collected:           7/8/2014 0925                                       Medicine/OB                                                                  First Screen:          CHRIS Duran       Received:            7/9/2014 0918                                       (ASCP)                                                                                                                         Signed Out:          7/19/2014 0802 (Final)                                                                                                           Specimen:    SUREPATH  PAP, DIAG REGARDLESS, Endocervical/cervical                                        Interpretation 07/08/2014 Negative for squamous intraepithelial lesion or malignancy   Final     Interpretation 07/08/2014 No HPV Type(s) Detected  No HPV Type(s) Detected, No High Risk HPV Type(s) Detected, DNA Quantity Not Sufficient Final   Orders Only on 07/08/2014   Component Date Value Ref Range Status     Hemoglobin A1c 07/08/2014 5.4  3.5 - 6.0 % Final   Orders Only on 07/08/2014   Component Date Value Ref Range Status     Cholesterol 07/08/2014 157  <=199 mg/dL Final     Triglycerides 07/08/2014 112  <=149 mg/dL Final     HDL Cholesterol 07/08/2014 39* >=40 mg/dL Final     LDL Calculated 07/08/2014 96  0 - 129 mg/dL Final   There may be more visits with results that are not included.      ASSESSMENT/PLAN:   1. Cancer screening: Pap smear up to date.  2. Discussed Calcium, vitamin D, and weight bearing exercise.  3. Discussed maintenance of healthy body weight. The following high BMI interventions were performed this visit: encouragement to exercise and lifestyle education regarding diet.  4. Contraception: OCP rx  5. Secondary amenorrhea due to PCOS. Rx OCP for endometrial protection.  6. Lower back pain due to deconditioning, obesity, muscle spasm. PT referral, cyclobenzaprine rx.    Germania was seen today for annual exam, spot on head, back pain and amenorrhea.    Diagnoses and all orders for this visit:    Routine general medical examination at a health care facility  -     Influenza, Seasonal,Quad Inj, 36+ MOS  -     Tdap vaccine greater than or equal to 6yo IM    Obsessive-compulsive disorder  -     sertraline (ZOLOFT) 100 MG tablet; Take 1 tablet (100 mg total) by mouth daily.    Mild Recurrent Major Depression  -     sertraline (ZOLOFT) 100 MG tablet; Take 1 tablet (100 mg total) by mouth daily.    Generalized anxiety disorder  -     sertraline (ZOLOFT) 100 MG tablet; Take 1 tablet (100 mg total) by mouth daily.    Muscle  spasm of back  -     Ambulatory referral to Physical Therapy  -     cyclobenzaprine (FLEXERIL) 10 MG tablet; Take 1 tablet (10 mg total) by mouth bedtime as needed for muscle spasms.    Morbid obesity with BMI of 40.0-44.9, adult    Polycystic ovarian syndrome  -     levonorgestrel-ethinyl estradiol (SEASONALE) 0.15 mg-30 mcg per tablet; Take 1 tablet by mouth daily.    Other orders  -     Cancel: Td, Preservative Free

## 2021-06-14 NOTE — TELEPHONE ENCOUNTER
RN cannot approve Refill Request    RN can NOT refill this medication Protocol failed and NO refill given. Last office visit: Visit date not found Last Physical: 12/27/2019 Last MTM visit: Visit date not found Last visit same specialty: Visit date not found.  Next visit within 3 mo: Visit date not found  Next physical within 3 mo: Visit date not found      Sita Barron, [facility] Connection Triage/Med Refill 2/3/2021    Requested Prescriptions   Pending Prescriptions Disp Refills     levonorgestrel-ethinyl estradiol (SEASONALE) 0.15 mg-30 mcg (91) per tablet [Pharmacy Med Name: LEVONOR-ETH ESTRAD 0.15-0.03] 91 tablet 4     Sig: TAKE 1 TABLET BY MOUTH EVERY DAY       Oral Contraceptives Protocol Failed - 2/3/2021 12:09 AM        Failed - Visit with PCP or prescribing provider visit in last 12 months      Last office visit with prescriber/PCP: Visit date not found OR same dept: Visit date not found OR same specialty: Visit date not found  Last physical: 12/27/2019 Last MTM visit: Visit date not found   Next visit within 3 mo: Visit date not found  Next physical within 3 mo: Visit date not found  Prescriber OR PCP: Brooke Pal MD  Last diagnosis associated with med order: 1. Polycystic ovarian syndrome  - levonorgestrel-ethinyl estradiol (SEASONALE) 0.15 mg-30 mcg (91) per tablet [Pharmacy Med Name: LEVONOR-ETH ESTRAD 0.15-0.03]; TAKE 1 TABLET BY MOUTH EVERY DAY  Dispense: 91 tablet; Refill: 4    If protocol passes may refill for 12 months if within 3 months of last provider visit (or a total of 15 months).

## 2021-06-15 PROBLEM — E28.2 POLYCYSTIC OVARIAN SYNDROME: Status: ACTIVE | Noted: 2017-01-31

## 2021-06-15 PROBLEM — M62.830 MUSCLE SPASM OF BACK: Status: ACTIVE | Noted: 2017-01-30

## 2021-06-15 NOTE — TELEPHONE ENCOUNTER
No future appointments.  Health Maintenance Due   Topic Date Due     HEPATITIS C SCREENING  1979     INFLUENZA VACCINE RULE BASED (1) 08/01/2020     PREVENTIVE CARE VISIT  12/27/2020     BP Readings from Last 3 Encounters:   12/27/19 108/72   08/16/18 120/78   01/30/17 118/76

## 2021-06-16 PROBLEM — E78.5 DYSLIPIDEMIA (HIGH LDL; LOW HDL): Status: ACTIVE | Noted: 2018-08-16

## 2021-06-16 NOTE — TELEPHONE ENCOUNTER
RN cannot approve Refill Request    RN can NOT refill this medication PCP messaged that patient is overdue for Office Visit and Protocol failed and NO refill given. Last office visit: Visit date not found Last Physical: 12/27/2019 Last MTM visit: Visit date not found Last visit same specialty: Visit date not found.  Next visit within 3 mo: Visit date not found  Next physical within 3 mo: Visit date not found      Grace Sarkar, [facility] Connection Triage/Med Refill 3/25/2021    Requested Prescriptions   Pending Prescriptions Disp Refills     sertraline (ZOLOFT) 100 MG tablet [Pharmacy Med Name: SERTRALINE  MG TABLET] 180 tablet 4     Sig: TAKE 2 TABLETS BY MOUTH EVERY DAY       [facility] Refill Protocol  Failed - 3/25/2021 12:16 AM        Failed - PCP or prescribing provider visit in last year     Last office visit with prescriber/PCP: Visit date not found OR same dept: Visit date not found OR same specialty: Visit date not found  Last physical: 12/27/2019 Last MTM visit: Visit date not found   Next visit within 3 mo: Visit date not found  Next physical within 3 mo: Visit date not found  Prescriber OR PCP: Brooke Pal MD  Last diagnosis associated with med order: 1. Obsessive-compulsive disorder  - sertraline (ZOLOFT) 100 MG tablet [Pharmacy Med Name: SERTRALINE  MG TABLET]; TAKE 2 TABLETS BY MOUTH EVERY DAY  Dispense: 180 tablet; Refill: 4    2. Mild Recurrent Major Depression  - sertraline (ZOLOFT) 100 MG tablet [Pharmacy Med Name: SERTRALINE  MG TABLET]; TAKE 2 TABLETS BY MOUTH EVERY DAY  Dispense: 180 tablet; Refill: 4    3. Generalized anxiety disorder  - sertraline (ZOLOFT) 100 MG tablet [Pharmacy Med Name: SERTRALINE  MG TABLET]; TAKE 2 TABLETS BY MOUTH EVERY DAY  Dispense: 180 tablet; Refill: 4    If protocol passes may refill for 12 months if within 3 months of last provider visit (or a total of 15 months).

## 2021-06-16 NOTE — TELEPHONE ENCOUNTER
Left message #1 at [phone]. Postponing task out to a week and will try again. If patient returns call back, please help patient schedule an appointment per message below. Thanks!

## 2021-06-16 NOTE — TELEPHONE ENCOUNTER
Left message #2 at [phone]. Sending letter out and postponing task out to 2 weeks and will try again if an appointment hasn't been made. If patient returns call back, please help patient schedule an appointment per message below. Thanks!

## 2021-06-16 NOTE — TELEPHONE ENCOUNTER
Due for physical. Please schedule.    No future appointments.  Health Maintenance Due   Topic Date Due     HEPATITIS C SCREENING  Never done     INFLUENZA VACCINE RULE BASED (1) 08/01/2020     PREVENTIVE CARE VISIT  12/27/2020     BP Readings from Last 3 Encounters:   12/27/19 108/72   08/16/18 120/78   01/30/17 118/76

## 2021-06-16 NOTE — TELEPHONE ENCOUNTER
Left message #3 at [phone]. If patient returns call back, please help patient schedule an appointment per message below. Thanks! No letter was sent out. Sending letter out and postponing task out to two weeks and will check to see if patient made an appointment. If no appointment is made when task comes back, we are completing the task.

## 2021-06-19 NOTE — PROGRESS NOTES
SUBJECTIVE:   Germania Cheung is a 37 y.o. female who presents for a routine physical exam.     Current concerns include the following: none    Notes:  Doing well with diet and exercise. Not interested in other changes at this time.  Depression in remission. Still has some anxiety, but it's OK.   Has tiny skin tags and a mole to check.  Eczema stable. Uses occasional steroid cream.    Patient Active Problem List    Diagnosis Date Noted     Morbid obesity with BMI of 40.0-44.9, adult (H)      Priority: High     Polycystic ovarian syndrome 01/31/2017     Priority: Medium     Overview Note:     Secondary amenorrhea and signs of hyperandrogenism (acne and hair growth).  Hormone evaluation otherwise negative.  Has not had pelvic ultrasound.       Secondary amenorrhea 11/24/2015     Priority: Medium     Muscle spasm of back 01/30/2017     Priority: Low     Depression, major, in remission (H)      Priority: Low     Generalized anxiety disorder      Priority: Low     Obsessive Compulsive Disorder      Priority: Low     Eczema      Priority: Low     Past Medical History:   Diagnosis Date     Nicotine dependence     7 pack years     Postpartum depression       Past Surgical History:   Procedure Laterality Date     FINGER GANGLION CYST EXCISION Left 2009    Third finger MCP joint     LAPAROSCOPIC CHOLECYSTECTOMY  2000     TONSILLECTOMY  1981      Current Outpatient Prescriptions   Medication Sig Dispense Refill     levonorgestrel-ethinyl estradiol (SEASONALE) 0.15 mg-30 mcg per tablet Take 1 tablet by mouth daily. 91 tablet 3     sertraline (ZOLOFT) 100 MG tablet Take 2 tablets (200 mg total) by mouth daily. 180 tablet 4     triamcinolone (KENALOG) 0.1 % cream Apply topically 2 (two) times a day as needed. For eczema. Limit to less than 14 days. 80 g 1     No current facility-administered medications for this visit.       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 Jorgeugher: Lili.    Eats fruits, veggies. Excess calories in evenings.    Exercise: walking dog, elliptical, light weights. Gym 3x/wk and YouTube channel 5x/wk.    Wears seatbelts, helmets, and sunscreen.      Family History   Problem Relation Age of Onset     Alcohol abuse Paternal Grandfather      Emphysema Paternal Grandfather      smoker     Lung cancer Paternal Grandfather      Hypertension Paternal Grandfather      Breast cancer Paternal Aunt 40     has had twice     Hypertension Father      Other Brother      Hypoglycemia      Other Mother      Menorrhagia     Emphysema Maternal Grandfather      smoker     Lung cancer Maternal Grandfather      Brain cancer Paternal Grandmother       young     No Medical Problems Maternal Grandmother       REVIEW OF SYSTEMS: negative, except as listed in subjective above.    PHYSICAL EXAM:   /78 (Patient Site: Right Arm, Patient Position: Sitting, Cuff Size: Adult Regular)  Pulse 98  Temp 98.2  F (36.8  C) (Oral)   Resp 16  Ht 5' 9\" (1.753 m)  Wt (!) 290 lb (131.5 kg)  LMP 2018 (Exact Date)  BMI 42.83 kg/m2   History   Smoking Status     Former Smoker     Years: 8.00     Types: Cigarettes   Smokeless Tobacco     Never Used     GENERAL: Alert, NAD.  HEAD: NC/AT.  EARS: Normal canal, pinnae and tympanic membrane.  EYES: PERRL, normal fundi.  NOSE: Normal mucosa.  MOUTH: " Adequate dentition, normal throat.  NECK: normal thyroid, midline trachea.  BREASTS: no masses, skin changes, nipple discharge or tenderness.  LUNGS: CTA bilaterally, no increased work of breathing.  HEART: RRR, no m/r/g  ABDOMEN: soft, nt/nd, BS+  : Declined  MSK: No significant deformity.  SKIN: small skin tags on neck and axilla. Small tan firm skin bump on right thigh appears benign.  LYMPHATICS: no lymphadenopathy.   PSYCH: normal affect.    Recent Results (from the past 24 hour(s))   Glycosylated Hemoglobin A1c   Result Value Ref Range    Hemoglobin A1c 5.3 3.5 - 6.0 %     No results found.    ASSESSMENT/PLAN:   1. Cancer screening: discussed beginning mammograms at 38 yo due to aunt's history and patient's concern.  2. Discussed Calcium, vitamin D, and weight bearing exercise.  3. BMI 42. Discussed maintenance of healthy body weight. The following high BMI interventions were performed this visit: encouragement to exercise and lifestyle education regarding diet.  4. Contraception: on birth control pills for menstrual regulation (PCOS + oligomenorrhea).  5. Advance directive: form given I have had an Advance Directives discussion with the patient.   6. Depression/anxiety/OCD. Controlled. Refilled sertraline for a year.    Problem List Items Addressed This Visit        High    Morbid obesity with BMI of 40.0-44.9, adult (H)    Relevant Orders    Glycosylated Hemoglobin A1c (Completed)    Lipid Cascade       Medium    Secondary amenorrhea    Polycystic ovarian syndrome    Relevant Medications    levonorgestrel-ethinyl estradiol (SEASONALE) 0.15 mg-30 mcg per tablet       Low    Generalized anxiety disorder    Relevant Medications    sertraline (ZOLOFT) 100 MG tablet    Obsessive Compulsive Disorder    Relevant Medications    sertraline (ZOLOFT) 100 MG tablet    Muscle spasm of back      Other Visit Diagnoses     Routine general medical examination at a health care facility    -  Primary    Mild Recurrent Major  Depression        Relevant Medications    sertraline (ZOLOFT) 100 MG tablet    Eczema        Relevant Medications    triamcinolone (KENALOG) 0.1 % cream

## 2021-06-20 ENCOUNTER — HEALTH MAINTENANCE LETTER (OUTPATIENT)
Age: 41
End: 2021-06-20

## 2021-06-20 NOTE — LETTER
Letter by Brooke Pal MD at      Author: Brooke Pal MD Service: -- Author Type: --    Filed:  Encounter Date: 12/27/2019 Status: Signed                    My Depression Action Plan  Name: Germania Cheung   Date of Birth 1980  Date: 12/27/2019    My Doctor: Brooke Pal MD   My Clinic: [facility]  [address] [facility] [address] [facility] [address]  [phone]          GREEN    ZONE   Good Control    What it looks like:     Things are going generally well. You have normal ups and downs. You may even feel depressed from time to time, but bad moods usually last less than a day.   What you need to do:  1. Continue to care for yourself (see self care plan)  2. Check your depression survival kit and update it as needed  3. Follow your physicians recommendations including any medication.  4. Do not stop taking medication unless you consult with your physician first.           YELLOW         ZONE Getting Worse    What it looks like:     Depression is starting to interfere with your life.     It may be hard to get out of bed; you may be starting to isolate yourself from others.    Symptoms of depression are starting to last most all day and this has happened for several days.     You may have suicidal thoughts but they are not constant.   What you need to do:     1. Call your care team, your response to treatment will improve if you keep your care team informed of your progress. Yellow periods are signs an adjustment may need to be made.     2. Continue your self-care, even if you have to fake it!    3. Talk to someone in your support network    4. Open up your depression survival kit           RED    ZONE Medical Alert - Get Help    What it looks like:     Depression is seriously interfering with your life.     You may experience these or other symptoms: You cant get out of bed most days, cant work or engage in other necessary activities, you have trouble taking care of basic hygiene, or  basic responsibilities, thoughts of suicide or death that will not go away, self-injurious behavior.     What you need to do:  1. Call your care team and request a same-day appointment. If they are not available (weekends or after hours) call your local crisis line, emergency room or 911.            Depression Self Care Plan / Survival Kit    Self-Care for Depression  Heres the deal. Your body and mind are really not as separate as most people think.  What you do and think affects how you feel and how you feel influences what you do and think. This means if you do things that people who feel good do, it will help you feel better.  Sometimes this is all it takes.  There is also a place for medication and therapy depending on how severe your depression is, so be sure to consult with your medical provider and/ or Behavioral Health Consultant if your symptoms are worsening or not improving.     In order to better manage my stress, I will:    Exercise  Get some form of exercise, every day. This will help reduce pain and release endorphins, the feel good chemicals in your brain. This is almost as good as taking antidepressants!  This is not the same as joining a gym and then never going! (they count on that by the way?) It can be as simple as just going for a walk or doing some gardening, anything that will get you moving.      Hygiene   Maintain good hygiene (Get out of bed in the morning, Make your bed, Brush your teeth, Take a shower, and Get dressed like you were going to work, even if you are unemployed).  If your clothes don't fit try to get ones that do.    Diet  I will strive to eat foods that are good for me, drink plenty of water, and avoid excessive sugar, caffeine, alcohol, and other mood-altering substances.  Some foods that are helpful in depression are: complex carbohydrates, B vitamins, flaxseed, fish or fish oil, fresh fruits and vegetables.    Psychotherapy  I agree to participate in Individual Therapy  (if recommended).    Medication  If prescribed medications, I agree to take them.  Missing doses can result in serious side effects.  I understand that drinking alcohol, or other illicit drug use, may cause potential side effects.  I will not stop my medication abruptly without first discussing it with my provider.    Staying Connected With Others  I will stay in touch with my friends, family members, and my primary care provider/team.    Use your imagination  Be creative.  We all have a creative side; it doesnt matter if its oil painting, sand castles, or mud pies! This will also kick up the endorphins.    Witness Beauty  (AKA stop and smell the roses) Take a look outside, even in mid-winter. Notice colors, textures. Watch the squirrels and birds.     Service to others  Be of service to others.  There is always someone else in need.  By helping others we can get out of ourselves and remember the really important things.  This also provides opportunities for practicing all the other parts of the program.    Humor  Laugh and be silly!  Adjust your TV habits for less news and crime-drama and more comedy.    Control your stress  Try breathing deep, massage therapy, biofeedback, and meditation. Find time to relax each day.     My support system    Clinic Contact:  Phone number:    Contact 1:  Phone number:    Contact 2:  Phone number:    [facility]:  Phone number:    Therapist:  Phone number:    [facility] crisis center:    Phone number:    Other community support:  Phone number:

## 2021-06-21 NOTE — LETTER
Letter by Brooke Pal MD at      Author: Brooke Pal MD Service: -- Author Type: --    Filed:  Encounter Date: 4/5/2021 Status: (Other)         Germania Cheung  [address] [facility] [address]      April 5, 2021      Dear Germania,    As a valued [facility] patient, your healthcare needs are our priority.  Your health care team has determined that you are due for an appointment regarding your Physical and future medication check and refill..    To help prevent delays in your care, please call the [facility] at [phone].    We look forward to partnering with you to achieve optimal health and wellbeing.    Sincerely,  Your care team at [facility]

## 2021-07-09 ENCOUNTER — COMMUNICATION - HEALTHEAST (OUTPATIENT)
Dept: FAMILY MEDICINE | Facility: CLINIC | Age: 41
End: 2021-07-09

## 2021-07-09 NOTE — TELEPHONE ENCOUNTER
Telephone Encounter by Anjana De La Rosa MA at 7/9/2021  7:04 AM     Author: Anjana De La Rosa MA Service: -- Author Type: Medical Assistant    Filed: 7/9/2021  7:05 AM Encounter Date: 7/8/2021 Status: Signed    : Anjana De La Rosa MA (Medical Assistant)       Please schedule appointment with Dr. Tejada

## 2021-07-16 ENCOUNTER — RECORDS - HEALTHEAST (OUTPATIENT)
Dept: FAMILY MEDICINE | Facility: CLINIC | Age: 41
End: 2021-07-16

## 2021-07-16 NOTE — TELEPHONE ENCOUNTER
Telephone Encounter by Amelie Vuong at 7/16/2021 10:18 AM     Author: Amelie Vuong Service: -- Author Type: Patient Access    Filed: 7/16/2021 10:20 AM Encounter Date: 7/16/2021 Status: Signed    : Amelie Vuong (Patient Access)

## 2021-07-16 NOTE — TELEPHONE ENCOUNTER
Telephone Encounter by Amelie Vuong at 7/16/2021 10:21 AM     Author: Amelie Vuong Service: -- Author Type: Patient Access    Filed: 7/16/2021 10:21 AM Encounter Date: 7/16/2021 Status: Signed    : Amelie Vuong (Patient Access)       Left message #1 at [phone]. Postponing task out to a week and will try again. If patient returns call back, please help patient schedule an appointment per message below. Thanks!

## 2021-07-26 ENCOUNTER — TELEPHONE (OUTPATIENT)
Dept: FAMILY MEDICINE | Facility: CLINIC | Age: 41
End: 2021-07-26

## 2021-07-26 ENCOUNTER — COMMUNICATION - HEALTHEAST (OUTPATIENT)
Dept: FAMILY MEDICINE | Facility: CLINIC | Age: 41
End: 2021-07-26

## 2021-07-26 NOTE — TELEPHONE ENCOUNTER
Patient Calls    Amelie Vuong 10 days ago   CT     Left message #1 at [phone]. Postponing task out to a week and will try again. If patient returns call back, please help patient schedule an appointment per message below. Thanks!            Documentation    Please schedule appt with Dr. Tejada for therapist referral regarding anxiety/therapy.

## 2021-07-26 NOTE — TELEPHONE ENCOUNTER
Left message #2 at [phone]. Sending letter out and postponing task out to 2 weeks and will try again if an appointment hasn't been made. If patient returns call back, please help patient schedule an appointment per message below. Thanks!

## 2021-08-09 NOTE — TELEPHONE ENCOUNTER
Left message #3 at [phone]. If patient returns call back, please help patient schedule an appointment per message below. Thanks! We have made several attempts to contact patient by phone and letter to schedule an appointment. Unfortunately, our calls have not been returned and we were unable to schedule. At this time, we will no longer make an attempt to schedule this appointment. Completing task.

## 2021-10-10 ENCOUNTER — HEALTH MAINTENANCE LETTER (OUTPATIENT)
Age: 41
End: 2021-10-10

## 2022-03-20 DIAGNOSIS — E28.2 POLYCYSTIC OVARIAN SYNDROME: ICD-10-CM

## 2022-03-21 NOTE — TELEPHONE ENCOUNTER
"Routing refill request to provider for review/approval because:  Patient needs to be seen because it has been more than 2 years since last office visit. No upcoming appt.      Last Written Prescription Date:  02/04/2021  Last Fill Quantity: 91,  # refills: 4   Last office visit provider:  12/27/2019 with Dr Pal.     Requested Prescriptions   Pending Prescriptions Disp Refills     levonorgestrel-ethinyl estradiol (SEASONALE) 0.15-0.03 MG tablet [Pharmacy Med Name: LEVONOR-ETH ESTRAD 0.15-0.03] 91 tablet 4     Sig: TAKE 1 TABLET BY MOUTH EVERY DAY       Contraceptives Protocol Failed - 3/20/2022  9:44 AM        Failed - Recent (12 mo) or future (30 days) visit within the authorizing provider's specialty     Patient has had an office visit with the authorizing provider or a provider within the authorizing providers department within the previous 12 mos or has a future within next 30 days. See \"Patient Info\" tab in inbasket, or \"Choose Columns\" in Meds & Orders section of the refill encounter.              Passed - Patient is not a current smoker if age is 35 or older        Passed - Medication is active on med list        Passed - No active pregnancy on record        Passed - No positive pregnancy test in past 12 months             Grace Sarkar 03/21/22 5:43 PM  "

## 2022-03-22 RX ORDER — LEVONORGESTREL AND ETHINYL ESTRADIOL 0.15-0.03
KIT ORAL
Qty: 91 TABLET | Refills: 4 | OUTPATIENT
Start: 2022-03-22 | End: 2022-04-26

## 2022-04-25 ENCOUNTER — ANCILLARY PROCEDURE (OUTPATIENT)
Dept: GENERAL RADIOLOGY | Facility: CLINIC | Age: 42
End: 2022-04-25
Attending: PHYSICIAN ASSISTANT
Payer: COMMERCIAL

## 2022-04-25 ENCOUNTER — APPOINTMENT (OUTPATIENT)
Dept: ULTRASOUND IMAGING | Facility: CLINIC | Age: 42
DRG: 176 | End: 2022-04-25
Attending: EMERGENCY MEDICINE
Payer: COMMERCIAL

## 2022-04-25 ENCOUNTER — OFFICE VISIT (OUTPATIENT)
Dept: FAMILY MEDICINE | Facility: CLINIC | Age: 42
End: 2022-04-25
Payer: COMMERCIAL

## 2022-04-25 ENCOUNTER — APPOINTMENT (OUTPATIENT)
Dept: CT IMAGING | Facility: CLINIC | Age: 42
DRG: 176 | End: 2022-04-25
Attending: EMERGENCY MEDICINE
Payer: COMMERCIAL

## 2022-04-25 ENCOUNTER — HOSPITAL ENCOUNTER (INPATIENT)
Facility: CLINIC | Age: 42
LOS: 1 days | Discharge: HOME | DRG: 176 | End: 2022-04-26
Attending: EMERGENCY MEDICINE | Admitting: HOSPITALIST
Payer: COMMERCIAL

## 2022-04-25 VITALS
BODY MASS INDEX: 45.96 KG/M2 | SYSTOLIC BLOOD PRESSURE: 138 MMHG | TEMPERATURE: 98 F | RESPIRATION RATE: 16 BRPM | HEART RATE: 109 BPM | WEIGHT: 293 LBS | DIASTOLIC BLOOD PRESSURE: 81 MMHG | OXYGEN SATURATION: 94 %

## 2022-04-25 DIAGNOSIS — R06.89 WINDED: Primary | ICD-10-CM

## 2022-04-25 DIAGNOSIS — I82.451 ACUTE DEEP VEIN THROMBOSIS (DVT) OF RIGHT PERONEAL VEIN (H): ICD-10-CM

## 2022-04-25 DIAGNOSIS — G44.209 TENSION HEADACHE: Primary | ICD-10-CM

## 2022-04-25 DIAGNOSIS — R06.89 WINDED: ICD-10-CM

## 2022-04-25 DIAGNOSIS — I26.94 MULTIPLE SUBSEGMENTAL PULMONARY EMBOLI WITHOUT ACUTE COR PULMONALE (H): ICD-10-CM

## 2022-04-25 LAB
ANION GAP SERPL CALCULATED.3IONS-SCNC: 13 MMOL/L (ref 5–18)
ATRIAL RATE - MUSE: 86 BPM
ATRIAL RATE - MUSE: 96 BPM
BNP SERPL-MCNC: 61 PG/ML (ref 0–64)
BUN SERPL-MCNC: 16 MG/DL (ref 8–22)
CALCIUM SERPL-MCNC: 9.7 MG/DL (ref 8.5–10.5)
CHLORIDE BLD-SCNC: 105 MMOL/L (ref 98–107)
CO2 SERPL-SCNC: 23 MMOL/L (ref 22–31)
CREAT SERPL-MCNC: 0.89 MG/DL (ref 0.6–1.1)
D DIMER PPP FEU-MCNC: 7.73 UG/ML FEU (ref 0–0.5)
DEPRECATED S PYO AG THROAT QL EIA: NEGATIVE
DIASTOLIC BLOOD PRESSURE - MUSE: NORMAL MMHG
DIASTOLIC BLOOD PRESSURE - MUSE: NORMAL MMHG
ERYTHROCYTE [DISTWIDTH] IN BLOOD BY AUTOMATED COUNT: 11.9 % (ref 10–15)
GFR SERPL CREATININE-BSD FRML MDRD: 83 ML/MIN/1.73M2
GLUCOSE BLD-MCNC: 84 MG/DL (ref 70–125)
GROUP A STREP BY PCR: NOT DETECTED
HCT VFR BLD AUTO: 41.5 % (ref 35–47)
HGB BLD-MCNC: 13.9 G/DL (ref 11.7–15.7)
INTERPRETATION ECG - MUSE: NORMAL
INTERPRETATION ECG - MUSE: NORMAL
MCH RBC QN AUTO: 29.6 PG (ref 26.5–33)
MCHC RBC AUTO-ENTMCNC: 33.5 G/DL (ref 31.5–36.5)
MCV RBC AUTO: 88 FL (ref 78–100)
P AXIS - MUSE: 48 DEGREES
P AXIS - MUSE: 51 DEGREES
PLATELET # BLD AUTO: 213 10E3/UL (ref 150–450)
POTASSIUM BLD-SCNC: 4.1 MMOL/L (ref 3.5–5)
PR INTERVAL - MUSE: 134 MS
PR INTERVAL - MUSE: 140 MS
QRS DURATION - MUSE: 86 MS
QRS DURATION - MUSE: 88 MS
QT - MUSE: 376 MS
QT - MUSE: 400 MS
QTC - MUSE: 475 MS
QTC - MUSE: 478 MS
R AXIS - MUSE: 42 DEGREES
R AXIS - MUSE: 52 DEGREES
RBC # BLD AUTO: 4.7 10E6/UL (ref 3.8–5.2)
SODIUM SERPL-SCNC: 141 MMOL/L (ref 136–145)
SYSTOLIC BLOOD PRESSURE - MUSE: NORMAL MMHG
SYSTOLIC BLOOD PRESSURE - MUSE: NORMAL MMHG
T AXIS - MUSE: 10 DEGREES
T AXIS - MUSE: 5 DEGREES
TROPONIN I SERPL-MCNC: 0.24 NG/ML (ref 0–0.29)
VENTRICULAR RATE- MUSE: 86 BPM
VENTRICULAR RATE- MUSE: 96 BPM
WBC # BLD AUTO: 9.6 10E3/UL (ref 4–11)

## 2022-04-25 PROCEDURE — 120N000001 HC R&B MED SURG/OB

## 2022-04-25 PROCEDURE — 36415 COLL VENOUS BLD VENIPUNCTURE: CPT | Performed by: PHYSICIAN ASSISTANT

## 2022-04-25 PROCEDURE — 96367 TX/PROPH/DG ADDL SEQ IV INF: CPT

## 2022-04-25 PROCEDURE — 99291 CRITICAL CARE FIRST HOUR: CPT

## 2022-04-25 PROCEDURE — 250N000011 HC RX IP 250 OP 636: Performed by: EMERGENCY MEDICINE

## 2022-04-25 PROCEDURE — 93005 ELECTROCARDIOGRAM TRACING: CPT | Performed by: PHYSICIAN ASSISTANT

## 2022-04-25 PROCEDURE — 85379 FIBRIN DEGRADATION QUANT: CPT | Performed by: PHYSICIAN ASSISTANT

## 2022-04-25 PROCEDURE — 83880 ASSAY OF NATRIURETIC PEPTIDE: CPT | Performed by: EMERGENCY MEDICINE

## 2022-04-25 PROCEDURE — 36415 COLL VENOUS BLD VENIPUNCTURE: CPT | Performed by: EMERGENCY MEDICINE

## 2022-04-25 PROCEDURE — 87651 STREP A DNA AMP PROBE: CPT | Performed by: PHYSICIAN ASSISTANT

## 2022-04-25 PROCEDURE — 99223 1ST HOSP IP/OBS HIGH 75: CPT | Performed by: HOSPITALIST

## 2022-04-25 PROCEDURE — 71275 CT ANGIOGRAPHY CHEST: CPT

## 2022-04-25 PROCEDURE — 80048 BASIC METABOLIC PNL TOTAL CA: CPT | Performed by: EMERGENCY MEDICINE

## 2022-04-25 PROCEDURE — 93005 ELECTROCARDIOGRAM TRACING: CPT | Performed by: EMERGENCY MEDICINE

## 2022-04-25 PROCEDURE — U0003 INFECTIOUS AGENT DETECTION BY NUCLEIC ACID (DNA OR RNA); SEVERE ACUTE RESPIRATORY SYNDROME CORONAVIRUS 2 (SARS-COV-2) (CORONAVIRUS DISEASE [COVID-19]), AMPLIFIED PROBE TECHNIQUE, MAKING USE OF HIGH THROUGHPUT TECHNOLOGIES AS DESCRIBED BY CMS-2020-01-R: HCPCS | Performed by: PHYSICIAN ASSISTANT

## 2022-04-25 PROCEDURE — 96366 THER/PROPH/DIAG IV INF ADDON: CPT | Mod: 59

## 2022-04-25 PROCEDURE — 84484 ASSAY OF TROPONIN QUANT: CPT | Performed by: EMERGENCY MEDICINE

## 2022-04-25 PROCEDURE — 85027 COMPLETE CBC AUTOMATED: CPT | Performed by: PHYSICIAN ASSISTANT

## 2022-04-25 PROCEDURE — 93970 EXTREMITY STUDY: CPT

## 2022-04-25 PROCEDURE — 93010 ELECTROCARDIOGRAM REPORT: CPT | Performed by: INTERNAL MEDICINE

## 2022-04-25 PROCEDURE — U0005 INFEC AGEN DETEC AMPLI PROBE: HCPCS | Performed by: PHYSICIAN ASSISTANT

## 2022-04-25 PROCEDURE — 99215 OFFICE O/P EST HI 40 MIN: CPT | Performed by: PHYSICIAN ASSISTANT

## 2022-04-25 PROCEDURE — 96365 THER/PROPH/DIAG IV INF INIT: CPT

## 2022-04-25 PROCEDURE — 71046 X-RAY EXAM CHEST 2 VIEWS: CPT | Mod: TC | Performed by: RADIOLOGY

## 2022-04-25 RX ORDER — IOPAMIDOL 755 MG/ML
100 INJECTION, SOLUTION INTRAVASCULAR ONCE
Status: COMPLETED | OUTPATIENT
Start: 2022-04-25 | End: 2022-04-25

## 2022-04-25 RX ORDER — HEPARIN SODIUM 10000 [USP'U]/100ML
0-5000 INJECTION, SOLUTION INTRAVENOUS CONTINUOUS
Status: DISCONTINUED | OUTPATIENT
Start: 2022-04-25 | End: 2022-04-26

## 2022-04-25 RX ORDER — ACETAMINOPHEN 650 MG/1
650 SUPPOSITORY RECTAL EVERY 6 HOURS PRN
Status: DISCONTINUED | OUTPATIENT
Start: 2022-04-25 | End: 2022-04-26 | Stop reason: HOSPADM

## 2022-04-25 RX ORDER — LIDOCAINE 40 MG/G
CREAM TOPICAL
Status: DISCONTINUED | OUTPATIENT
Start: 2022-04-25 | End: 2022-04-26 | Stop reason: HOSPADM

## 2022-04-25 RX ORDER — ACETAMINOPHEN 325 MG/1
650 TABLET ORAL EVERY 6 HOURS PRN
Status: DISCONTINUED | OUTPATIENT
Start: 2022-04-25 | End: 2022-04-26 | Stop reason: HOSPADM

## 2022-04-25 RX ADMIN — IOPAMIDOL 100 ML: 755 INJECTION, SOLUTION INTRAVENOUS at 21:41

## 2022-04-25 RX ADMIN — HEPARIN SODIUM 1800 UNITS/HR: 10000 INJECTION, SOLUTION INTRAVENOUS at 22:51

## 2022-04-25 ASSESSMENT — ENCOUNTER SYMPTOMS
COUGH: 0
WHEEZING: 0
FEVER: 0
NAUSEA: 0
VOMITING: 0
CHEST TIGHTNESS: 1
SHORTNESS OF BREATH: 1
SHORTNESS OF BREATH: 1
ABDOMINAL PAIN: 0
CHILLS: 0
HEADACHES: 1
FEVER: 0
ABDOMINAL PAIN: 0
DIARRHEA: 0

## 2022-04-25 ASSESSMENT — ACTIVITIES OF DAILY LIVING (ADL): ADLS_ACUITY_SCORE: 7

## 2022-04-25 NOTE — PATIENT INSTRUCTIONS
1.  Your chest x-ray is normal.  Your EKG is also normal.  Your rapid strep test was negative, you will be notified with confirmatory strep test if it is positive.  2. Check your [employer] account for your COVID test results within the next 1-2 business days.  If you test positive you would qualify for monoclonal antibodies.  See instructions below to sign yourself up if you are interested.  3.  Monitor Cefdinir symptoms over the course the next few days.  Follow-up if you are not having improvement in your symptoms or if something worsens.        Treatment for Covid-19 is based on MASSBP Scoring below:      Age ?65 years (2 points)  BMI ?35 kg/m2 (2 points)  Diabetes mellitus (2 points)  Chronic kidney disease (3 points)  Cardiovascular disease in a patient ?55 years (2 points)  Chronic respiratory disease in a patient ?55 years (3 points)  Hypertension in a patient ?55 years (1 point)  Immunocompromised status (4 points)  Pregnancy (4 points)  Member of [facility] community (Black/, /, ,  or , or  or [place] Native) (2 points)     Monoclonal antibody infusion via the [place] [employer] Platform (MNRAP) system:     [email or web address]/[email or web address] - providers, patients or someone helping may complete this screening tool. [facility] offers a lottery process to refer patients for COVID-19 monoclonal antibody (mAb) treatment, including post-exposure prophylaxis. For patients unable to fill out a form online, they may call [phone] with questions about monoclonal antibodies.     Referrals are provisional, and final clinical judgment remains within the infusion site to determine eligibility and scheduling. When demand for mAb treatments increases, patients who are most at risk of severe illness will be prioritized.      Eligible patients are 12 or older, weigh at least 88 pounds, and are at high risk for severe COVID-19 (see MNRAP website for risk  categories).     For treatment of mild to moderate symptoms of COVID-19, patients must:  Test positive for SARS-CoV-2  Be within 10 days of the start of their symptoms  Not be hospitalized     For prophylaxis, eligible patients:  Have confirmed exposure OR are at high risk of being in close contact with someone with COVID-19 (e.g. nursing home resident)  Not fully vaccinated against COVID-19 OR are not expected to build up enough of an immune response to the complete COVID-19 vaccination

## 2022-04-25 NOTE — LETTER
[facility] EMERGENCY ROOM  [address] [address]  Phone: [phone]  Fax: [phone]    April 26, 2022        Germania Cheung  [address] [place] [id number]          To whom it may concern:    RE: Germania Solo was hospitalized from April 25 through April 26, 2022 for treatment of acute medical condition.  Please excuse her from work duties through May 3, 2022    Patient may return to work when she feels able with the following restrictions:  Light duty-unable to lift more than 15 pounds, no strenuous activity.    Please contact me for questions or concerns.      Sincerely,          Tate Pena DO, MS  [facility] Service  Internal Medicine

## 2022-04-25 NOTE — ED TRIAGE NOTES
Patient is here with shortness of breath and mid chest pain that started yesterday. D dimer was elevated at the urgent care. She was sent here.

## 2022-04-25 NOTE — PROGRESS NOTES
Patient presents with:  Shortness of Breath: Started yesterday with SOB  had sinus infection several weeks ago still has  some congestion achey last night some blurry vision today      Clinical Decision Making: Patient experiencing heavy chest sensation and winded sensation with minimal effort.  COVID test is in process.  Chest x-ray, CBC, and strep test were WNL.  D-dimer scattered to rule out PE, but was found to be significantly elevated.  Patient was referred to [facility] ED.  Report given to ED provider prior to the patient's arrival.      ICD-10-CM    1. Winded  R06.89 Streptococcus A Rapid Screen w/Reflex to PCR     Symptomatic; Unknown COVID-19 Virus (Coronavirus) by PCR Nose     XR Chest 2 Views     Group A Streptococcus PCR Throat Swab     EKG 12-lead, tracing only     D dimer, quantitative     CBC with platelets     D dimer, quantitative     CBC with platelets       Patient Instructions     1.  Your chest x-ray is normal.  Your EKG is also normal.  Your rapid strep test was negative, you will be notified with confirmatory strep test if it is positive.  2. Check your [employer] account for your COVID test results within the next 1-2 business days.  If you test positive you would qualify for monoclonal antibodies.  See instructions below to sign yourself up if you are interested.  3.  Monitor Cefdinir symptoms over the course the next few days.  Follow-up if you are not having improvement in your symptoms or if something worsens.        Treatment for Covid-19 is based on MASSBP Scoring below:        Age ?65 years (2 points)    BMI ?35 kg/m2 (2 points)    Diabetes mellitus (2 points)    Chronic kidney disease (3 points)    Cardiovascular disease in a patient ?55 years (2 points)    Chronic respiratory disease in a patient ?55 years (3 points)    Hypertension in a patient ?55 years (1 point)    Immunocompromised status (4 points)    Pregnancy (4 points)    Member of [facility] community (Black/,  /, ,  or , or  or [place] Native) (2 points)     Monoclonal antibody infusion via the [place] [employer] Platform (MNRAP) system:     [email or web address]/mnrap - providers, patients or someone helping may complete this screening tool. [facility] offers a lottery process to refer patients for COVID-19 monoclonal antibody (mAb) treatment, including post-exposure prophylaxis. For patients unable to fill out a form online, they may call [phone] with questions about monoclonal antibodies.     Referrals are provisional, and final clinical judgment remains within the infusion site to determine eligibility and scheduling. When demand for mAb treatments increases, patients who are most at risk of severe illness will be prioritized.      Eligible patients are 12 or older, weigh at least 88 pounds, and are at high risk for severe COVID-19 (see MNRAP website for risk categories).     For treatment of mild to moderate symptoms of COVID-19, patients must:    Test positive for SARS-CoV-2    Be within 10 days of the start of their symptoms    Not be hospitalized     For prophylaxis, eligible patients:    Have confirmed exposure OR are at high risk of being in close contact with someone with COVID-19 (e.g. nursing home resident)    Not fully vaccinated against COVID-19 OR are not expected to build up enough of an immune response to the complete COVID-19 vaccination             HPI:  Germania Cheung is a 41 year old female who presents today complaining of SOB and chest heaviness that started yesterday. She felt more winded with walking up the stairs. Patient reports some nasal congestion and body aches. She reports previously having a sinus infection on 3/20/22. She was treated with Amoxicillin 500 bid x 7 days. This morning the patient was reading and her vision felt a little blurry, but she'll get that with migraines. That symptom went away after about 10-15  min. She reports mild frontal head pain.     History obtained from the patient.    Problem List:  2018-08: Dyslipidemia (high LDL; low HDL)  2017-01: Polycystic ovarian syndrome  2017-01: Muscle spasm of back  2015-11: Secondary amenorrhea  Obesity (BMI 35.0-39.9 without comorbidity)  Eczema  Depression, major, in remission (H)  Generalized anxiety disorder  Obsessive Compulsive Disorder      Past Medical History:   Diagnosis Date     Nicotine dependence     7 pack years     Postpartum depression        Social History     Tobacco Use     Smoking status: Former Smoker     Years: 8.00     Types: Cigarettes, Cigarettes     Smokeless tobacco: Never Used   Substance Use Topics     Alcohol use: Yes     Comment: Alcoholic Drinks/day: 2-3 drinks per week       Review of Systems   Constitutional: Negative for chills and fever.   HENT: Negative for congestion.    Eyes: Positive for visual disturbance (10-15 min then resolved. ).   Respiratory: Positive for chest tightness and shortness of breath. Negative for cough and wheezing.    Cardiovascular: Negative for chest pain.   Gastrointestinal: Negative for abdominal pain, diarrhea, nausea and vomiting.   Allergic/Immunologic: Positive for environmental allergies.   Neurological: Positive for headaches (mild).       Vitals:    04/25/22 1145   BP: 138/81   Pulse: 109   Resp: 16   Temp: 98  F (36.7  C)   TempSrc: Oral   SpO2: 94%   Weight: 140.2 kg (309 lb)       Physical Exam  Vitals and nursing note reviewed.   Constitutional:       General: She is not in acute distress.     Appearance: She is not toxic-appearing or diaphoretic.   HENT:      Head: Normocephalic and atraumatic.      Right Ear: External ear normal.      Left Ear: External ear normal.   Eyes:      Conjunctiva/sclera: Conjunctivae normal.   Cardiovascular:      Rate and Rhythm: Normal rate and regular rhythm.      Heart sounds: No murmur heard.  Pulmonary:      Effort: Pulmonary effort is normal. No respiratory  distress.   Neurological:      Mental Status: She is alert.   Psychiatric:         Mood and Affect: Mood normal.         Behavior: Behavior normal.         Thought Content: Thought content normal.         Judgment: Judgment normal.         Results:  Results for orders placed or performed in visit on 04/25/22   XR Chest 2 Views     Status: None    Narrative    EXAM: XR CHEST 2 VW  LOCATION: [facility]  DATE/TIME: 4/25/2022 12:49 PM    INDICATION: Feeling off. SOB chest feels heavier. More easily winded. Lungs sound CTA  COMPARISON: None.      Impression    IMPRESSION:    The low right lateral ribs and lateral costophrenic sulcus were clipped on the frontal view, study limitation.    Normal heart and mediastinal contours. Normal meir and lung vascularity.    No airspace opacities or interstitial thickening. No central airway wall thickening or peribronchial fluid cuffs.    Diaphragm curvature is normal. No pleural effusion.   Results for orders placed or performed in visit on 04/25/22   D dimer, quantitative     Status: Abnormal   Result Value Ref Range    D-Dimer Quantitative 7.73 (H) 0.00 - 0.50 ug/mL FEU    Narrative    This D-dimer assay is intended for use in conjunction with a clinical pretest probability assessment model to exclude pulmonary embolism (PE) and deep venous thrombosis (DVT) in outpatients suspected of PE or DVT. The cut-off value is 0.50 ug/mL FEU.   CBC with platelets     Status: Normal   Result Value Ref Range    WBC Count 9.6 4.0 - 11.0 10e3/uL    RBC Count 4.70 3.80 - 5.20 10e6/uL    Hemoglobin 13.9 11.7 - 15.7 g/dL    Hematocrit 41.5 35.0 - 47.0 %    MCV 88 78 - 100 fL    MCH 29.6 26.5 - 33.0 pg    MCHC 33.5 31.5 - 36.5 g/dL    RDW 11.9 10.0 - 15.0 %    Platelet Count 213 150 - 450 10e3/uL   EKG 12-lead, tracing only     Status: None   Result Value Ref Range    Systolic Blood Pressure  mmHg    Diastolic Blood Pressure  mmHg    Ventricular Rate 86 BPM    Atrial Rate 86  BPM    NM Interval 134 ms    QRS Duration 86 ms     ms    QTc 478 ms    P Axis 48 degrees    R AXIS 42 degrees    T Axis 5 degrees    Interpretation ECG       Sinus rhythm  Nonspecific ST abnormality  Abnormal ECG  No previous ECGs available  Confirmed by DELONTE DAVISON MD LOC: ([id number]) on 4/25/2022 3:18:15 PM     Streptococcus A Rapid Screen w/Reflex to PCR     Status: Normal    Specimen: Throat; Swab   Result Value Ref Range    Group A Strep antigen Negative Negative         At the end of the encounter, I discussed results, diagnosis, medications. Discussed red flags for immediate return to clinic/ER, as well as indications for follow up if no improvement. Patient understood and agreed to plan. Patient was stable for discharge.

## 2022-04-25 NOTE — ED PROVIDER NOTES
0429.  Call from primary physician.  Patient with elevated D-dimer around 7.7 and feeling dyspnea on exertion and fatigue for the last 2 days.  No personal history of DVT.  Former smoker but quit many years ago.  Patient will be coming in for CT and further evaluation.  Lab work, EKG, D-dimer and other lab work done and pending from clinic.     Luis Enrique Sanchez MD  04/25/22 [id number]

## 2022-04-26 ENCOUNTER — APPOINTMENT (OUTPATIENT)
Dept: CARDIOLOGY | Facility: CLINIC | Age: 42
DRG: 176 | End: 2022-04-26
Attending: HOSPITALIST
Payer: COMMERCIAL

## 2022-04-26 VITALS
RESPIRATION RATE: 30 BRPM | WEIGHT: 293 LBS | HEART RATE: 80 BPM | HEIGHT: 69 IN | TEMPERATURE: 98.1 F | DIASTOLIC BLOOD PRESSURE: 70 MMHG | BODY MASS INDEX: 43.4 KG/M2 | SYSTOLIC BLOOD PRESSURE: 128 MMHG | OXYGEN SATURATION: 94 %

## 2022-04-26 PROBLEM — I82.451 ACUTE DEEP VEIN THROMBOSIS (DVT) OF RIGHT PERONEAL VEIN (H): Status: ACTIVE | Noted: 2022-04-26

## 2022-04-26 LAB
ERYTHROCYTE [DISTWIDTH] IN BLOOD BY AUTOMATED COUNT: 12.4 % (ref 10–15)
HCT VFR BLD AUTO: 38 % (ref 35–47)
HGB BLD-MCNC: 12.8 G/DL (ref 11.7–15.7)
LVEF ECHO: NORMAL
MCH RBC QN AUTO: 29.4 PG (ref 26.5–33)
MCHC RBC AUTO-ENTMCNC: 33.7 G/DL (ref 31.5–36.5)
MCV RBC AUTO: 87 FL (ref 78–100)
PLATELET # BLD AUTO: 204 10E3/UL (ref 150–450)
RADIOLOGIST FLAGS: ABNORMAL
RBC # BLD AUTO: 4.36 10E6/UL (ref 3.8–5.2)
SARS-COV-2 RNA RESP QL NAA+PROBE: NEGATIVE
SARS-COV-2 RNA RESP QL NAA+PROBE: NEGATIVE
TROPONIN I SERPL-MCNC: 0.16 NG/ML (ref 0–0.29)
UFH PPP CHRO-ACNC: 0.36 IU/ML
WBC # BLD AUTO: 9.9 10E3/UL (ref 4–11)

## 2022-04-26 PROCEDURE — C9803 HOPD COVID-19 SPEC COLLECT: HCPCS

## 2022-04-26 PROCEDURE — 250N000013 HC RX MED GY IP 250 OP 250 PS 637: Performed by: HOSPITALIST

## 2022-04-26 PROCEDURE — 999N000208 ECHOCARDIOGRAM COMPLETE

## 2022-04-26 PROCEDURE — 36415 COLL VENOUS BLD VENIPUNCTURE: CPT | Performed by: EMERGENCY MEDICINE

## 2022-04-26 PROCEDURE — 250N000011 HC RX IP 250 OP 636: Performed by: HOSPITALIST

## 2022-04-26 PROCEDURE — 93306 TTE W/DOPPLER COMPLETE: CPT | Mod: 26 | Performed by: INTERNAL MEDICINE

## 2022-04-26 PROCEDURE — 85520 HEPARIN ASSAY: CPT | Performed by: EMERGENCY MEDICINE

## 2022-04-26 PROCEDURE — 84484 ASSAY OF TROPONIN QUANT: CPT | Performed by: STUDENT IN AN ORGANIZED HEALTH CARE EDUCATION/TRAINING PROGRAM

## 2022-04-26 PROCEDURE — 87635 SARS-COV-2 COVID-19 AMP PRB: CPT | Performed by: HOSPITALIST

## 2022-04-26 PROCEDURE — 255N000002 HC RX 255 OP 636: Performed by: INTERNAL MEDICINE

## 2022-04-26 PROCEDURE — 99239 HOSP IP/OBS DSCHRG MGMT >30: CPT | Performed by: INTERNAL MEDICINE

## 2022-04-26 PROCEDURE — 250N000013 HC RX MED GY IP 250 OP 250 PS 637: Performed by: INTERNAL MEDICINE

## 2022-04-26 PROCEDURE — 85027 COMPLETE CBC AUTOMATED: CPT | Performed by: EMERGENCY MEDICINE

## 2022-04-26 RX ORDER — FEXOFENADINE HCL 180 MG/1
180 TABLET ORAL DAILY
COMMUNITY
End: 2023-10-06

## 2022-04-26 RX ORDER — ACETAMINOPHEN 500 MG
500-1000 TABLET ORAL EVERY 6 HOURS PRN
COMMUNITY
Start: 2022-04-26

## 2022-04-26 RX ORDER — APIXABAN 5 MG (74)
KIT ORAL
Qty: 74 EACH | Refills: 0 | Status: SHIPPED | OUTPATIENT
Start: 2022-04-26 | End: 2022-05-02

## 2022-04-26 RX ORDER — IBUPROFEN 200 MG
400-800 TABLET ORAL EVERY 8 HOURS PRN
COMMUNITY
End: 2022-04-26

## 2022-04-26 RX ADMIN — HEPARIN SODIUM 1800 UNITS/HR: 10000 INJECTION, SOLUTION INTRAVENOUS at 07:31

## 2022-04-26 RX ADMIN — ACETAMINOPHEN 650 MG: 325 TABLET ORAL at 01:00

## 2022-04-26 RX ADMIN — PERFLUTREN 3 ML: 6.52 INJECTION, SUSPENSION INTRAVENOUS at 09:30

## 2022-04-26 RX ADMIN — APIXABAN 10 MG: 5 TABLET, FILM COATED ORAL at 12:02

## 2022-04-26 RX ADMIN — ACETAMINOPHEN 650 MG: 325 TABLET ORAL at 07:38

## 2022-04-26 ASSESSMENT — ACTIVITIES OF DAILY LIVING (ADL)
ADLS_ACUITY_SCORE: 7
DEPENDENT_IADLS:: INDEPENDENT
ADLS_ACUITY_SCORE: 7

## 2022-04-26 NOTE — PHARMACY-ADMISSION MEDICATION HISTORY
Pharmacy Note - Admission Medication History    Pertinent Provider Information: will have a family member bring birth control pills from home     ______________________________________________________________________    Prior To Admission (PTA) med list completed and updated in EMR.       PTA Med List   Medication Sig Last Dose     fexofenadine (ALLEGRA) 180 MG tablet Take 180 mg by mouth daily 4/24/2022     ibuprofen (ADVIL/MOTRIN) 200 MG tablet Take 400-800 mg by mouth every 8 hours as needed for mild pain Unknown at Unknown time     levonorgestrel-ethinyl estradiol (SEASONALE) 0.15-0.03 MG tablet TAKE 1 TABLET BY MOUTH EVERY DAY 4/24/2022     sertraline (ZOLOFT) 100 MG tablet [SERTRALINE (ZOLOFT) 100 MG TABLET] TAKE 2 TABLETS BY MOUTH EVERY DAY 4/24/2022     triamcinolone (KENALOG) 0.1 % cream [TRIAMCINOLONE (KENALOG) 0.1 % CREAM] Apply topically 2 (two) times a day as needed. For eczema. Limit to less than 14 days. Unknown at Unknown time       Information source(s): Patient and CareEverywhere/[facility]  Method of interview communication: in-person    Summary of Changes to PTA Med List  New: Allegra, ibuprofen  Discontinued: none  Changed: none    Patient was asked about OTC/herbal products specifically.  PTA med list reflects this.    In the past week, patient estimated taking medication this percent of the time:  greater than 90%.    Allergies were reviewed, assessed, and updated with the patient.      Medications available for use during hospital stay: Seasonale .     The information provided in this note is only as accurate as the sources available at the time of the update(s).    Thank you for the opportunity to participate in the care of this patient.    MARIAELENA GEE  4/26/2022 8:37 AM

## 2022-04-26 NOTE — CONSULTS
Care Management Initial Consult    General Information  Assessment completed with: Patient,    Type of CM/SW Visit: Initial Assessment    Primary Care Provider verified and updated as needed:     Readmission within the last 30 days: no previous admission in last 30 days      Reason for Consult: discharge planning  Advance Care Planning:            Communication Assessment  Patient's communication style: spoken language (English or Bilingual)    Hearing Difficulty or Deaf: no   Wear Glasses or Blind: no    Cognitive  Cognitive/Neuro/Behavioral: WDL                      Living Environment:   People in home: spouse     Current living Arrangements:        Able to return to prior arrangements: yes       Family/Social Support:  Care provided by: self  Provides care for:    Marital Status:              Description of Support System:           Current Resources:   Patient receiving home care services: No     Community Resources: None  Equipment currently used at home: none  Supplies currently used at home: None    Employment/Financial:  Employment Status:          Financial Concerns:             Lifestyle & Psychosocial Needs:  Social Determinants of Health     Tobacco Use: Medium Risk     Smoking Tobacco Use: Former Smoker     Smokeless Tobacco Use: Never Used   Alcohol Use: Not on file   Financial Resource Strain: Not on file   Food Insecurity: Not on file   Transportation Needs: Not on file   Physical Activity: Not on file   Stress: Not on file   Social Connections: Not on file   Intimate Partner Violence: Not on file   Depression: Not at risk     PHQ-2 Score: 0   Housing Stability: Not on file       Functional Status:  Prior to admission patient needed assistance:   Dependent ADLs:: Independent  Dependent IADLs:: Independent  Assesssment of Functional Status: At functional baseline    Mental Health Status:          Chemical Dependency Status:                Values/Beliefs:  Spiritual, Cultural Beliefs, Adventism  Practices, Values that affect care:                 Additional Information:  AIDET completed.  Pt lives with spouse Kyle in a private residence. She is independent with all ADL's, has no services and no DME used. Most likely DVT, has pain in right calf.     Anticipate pt will DC back home without needs. Family will transport upon DC. Informed that CM will follow progression of care.   Radha Noland RN, CM, ED

## 2022-04-26 NOTE — DISCHARGE SUMMARY
[facility] MEDICINE  DISCHARGE SUMMARY     Primary Care Physician: Brooke Pal  Admission Date: 4/25/2022   Discharge Provider: Tate Pena DO Discharge Date: 4/26/2022   Diet:   Active Diet and Nourishment Order   Procedures     Combination Diet Regular Diet Adult     Diet       Code Status: Full Code   Activity: DCACTIVITY: No lifting, driving or strenuous activity for 7 days.        Condition at Discharge: Stable     REASON FOR PRESENTATION(See Admission Note for Details)   Shortness of breath, dyspnea on exertion, bilateral pulmonary emboli.     PRINCIPAL & ACTIVE DISCHARGE DIAGNOSES     Active Problems:    Multiple bilateral pulmonary emboli without acute cor pulmonale (H)  Right peroneal vein deep venous thrombosis  Class III Obesity Body mass index is 45.63 kg/m .    PENDING LABS     Unresulted Labs Ordered in the Past 30 Days of this Admission     Date and Time Order Name Status Description    4/25/2022 12:30 PM COVID-19 VIRUS (CORONAVIRUS) BY PCR In process         PROCEDURES ( this hospitalization only)          RECOMMENDATIONS TO OUTPATIENT PROVIDER FOR F/U VISIT     Follow-up Appointments     Follow-up and recommended labs and tests       Follow up with primary care provider, Brooke Pal, within 7 days for   hospital follow- up and regarding new diagnosis.  Indication: bilateral   pulmonary embolism.  Will need to address birth control options after   treatment of thrombus.  Recommend repeat CT chest PE protocol in 3 to 6   months.           DISPOSITION     Home    SUMMARY OF HOSPITAL COURSE:      41-year-old female with history of morbid obesity, former smoker, uses oral estrogen/progesterone birth control, who presents with right calf cramping pain, and subsequent shortness of breath and chest discomfort occurring on Sunday, April 24, 2022.  Patient did not have significant improvement in symptoms and for this reason she presented to the emergency room  for further evaluation.  Radiographic studies included ultrasound of the lower extremity.  This revealed occlusive deep vein thrombosis in the upper calf region of the right peroneal vein.  CT chest pulmonary embolism scan was performed and revealed bilateral large clot burden with no evidence of right heart strain.  Pertinent labs included troponin I which remained within normal range at 0.24 and 0.16.  BNP was normal at 61.  Electrolyte and kidney function was normal.  CBC appeared normal.  Echocardiogram was obtained and showed no evidence of right heart strain.  LVEF was normal at 55 to 60%.  Patient had mild concentric left ventricular hypertrophy otherwise unremarkable echocardiogram.  And was initiated on IV heparin anticoagulation overnight.      On April 26 she had significant improvement in shortness of breath.  She was able to ambulate to and from the bathroom without significant shortness of breath.  She was not requiring supplemental oxygen.  Vital signs remained stable there was no arrhythmia noted on cardiac monitoring.  After discussion with patient and her  patient wished to treat thromboembolism with DOAC.  Patient has insurance coverage for Eliquis and this was initiated prior to patient's discharge.  She was recommended follow-up with primary care in 1 week.  Workplace restriction letter was provided to patient prior to discharge.  All questions regarding anticoagulation, and DVT and pulmonary embolism was reviewed with patient and  to their satisfaction.  Patient was advised to hold birth control pill until treatment of blood clots and discussion with primary care provider.    Discharge Medications with Med changes:     Current Discharge Medication List      START taking these medications    Details   acetaminophen (TYLENOL) 500 MG tablet Take 1-2 tablets (500-1,000 mg) by mouth every 6 hours as needed for headaches (and adjunct with moderate or severe pain or per patient request)     Associated Diagnoses: Tension headache      apixaban ANTICOAGULANT (ELIQUIS) 5 MG tablet Take 1 tablet (5 mg) by mouth 2 times daily  Qty: 60 tablet, Refills: 4    Associated Diagnoses: Multiple subsegmental pulmonary emboli without acute cor pulmonale (H); Acute deep vein thrombosis (DVT) of right peroneal vein (H)      Apixaban Starter Pack (ELIQUIS DVT/PE STARTER PACK) 5 MG TBPK Take 10 mg by mouth 2 times daily for 7 days, THEN 5 mg 2 times daily for 23 days.  Qty: 74 each, Refills: 0    Associated Diagnoses: Multiple subsegmental pulmonary emboli without acute cor pulmonale (H); Acute deep vein thrombosis (DVT) of right peroneal vein (H)         CONTINUE these medications which have NOT CHANGED    Details   fexofenadine (ALLEGRA) 180 MG tablet Take 180 mg by mouth daily      sertraline (ZOLOFT) 100 MG tablet [SERTRALINE (ZOLOFT) 100 MG TABLET] TAKE 2 TABLETS BY MOUTH EVERY DAY  Qty: 180 tablet, Refills: 4    Associated Diagnoses: Obsessive-compulsive disorder; Major depressive disorder, recurrent episode, mild (H); Generalized anxiety disorder      triamcinolone (KENALOG) 0.1 % cream [TRIAMCINOLONE (KENALOG) 0.1 % CREAM] Apply topically 2 (two) times a day as needed. For eczema. Limit to less than 14 days.  Qty: 80 g, Refills: 1    Associated Diagnoses: Eczema         STOP taking these medications       ibuprofen (ADVIL/MOTRIN) 200 MG tablet Comments:   Reason for Stopping:         levonorgestrel-ethinyl estradiol (SEASONALE) 0.15-0.03 MG tablet Comments:   Reason for Stopping:               Rationale for medication changes:      Discontinued Seasonale due to significant VTE event.  Discontinued ibuprofen while on anticoagulation.  Recommended Tylenol as needed for aches pains and headache    Consults     PHARMACY IP CONSULT  PHARMACY IP CONSULT  PHARMACY IP CONSULT  SOCIAL WORK IP CONSULT    Immunizations given this encounter     Most Recent Immunizations   Administered Date(s) Administered      COVID-19,PF,Moderna 11/29/2021     DT (PEDS <7y) 01/01/1998     Flu, Unspecified 01/30/2017     HepB-Adult 09/04/2009     Influenza (IIV3) PF 09/24/2013     Influenza Vaccine IM > 6 months Valent IIV4 (Alfuria,Fluzone) 10/03/2020     Influenza Vaccine, 6+MO IM (QUADRIVALENT W/PRESERVATIVES) 01/30/2017     Td (Adult), Adsorbed 09/09/1998     Td,adult,historic,unspecified 09/09/1998     Tdap (Adacel,Boostrix) 01/30/2017           Anticoagulation Information      Recent INR results: No results for input(s): INR in the last 168 hours.  Warfarin doses (if applicable) or name of other anticoagulant: Heparin gtt. Eliquis.       SIGNIFICANT IMAGING FINDINGS     Results for orders placed or performed during the hospital encounter of 04/25/22   CT Chest Pulmonary Embolism w Contrast    Impression    IMPRESSION:  1.  Pulmonary embolism is present bilaterally, large clot burden, however, no evidence for right heart strain.  2.  Results were discussed with Dr. Moe at approximately 10:13 PM 04/25/2022.   US Lower Extremity Venous Duplex Bilateral   Result Value Ref Range    Radiologist flags DVT right peroneal vein. (AA)     Impression    IMPRESSION:  1.  Occlusive deep vein thrombosis upper calf region of the right peroneal vein.    2.  The remainder of the right lower extremity veins and the entire left lower extremity veins are negative with no DVT.   Echocardiogram Complete   Result Value Ref Range    LVEF  55-60%        SIGNIFICANT LABORATORY FINDINGS     Most Recent 3 CBC's:Recent Labs   Lab Test 04/26/22  0102 04/25/22  1418   WBC 9.9 9.6   HGB 12.8 13.9   MCV 87 88    213     Most Recent 3 BMP's:Recent Labs   Lab Test 04/25/22  2111      POTASSIUM 4.1   CHLORIDE 105   CO2 23   BUN 16   CR 0.89   ANIONGAP 13   VERONA 9.7   GLC 84     Most Recent D-dimer:Recent Labs   Lab Test 04/25/22  1418   DD 7.73*     Discharge Orders        Reason for your hospital stay    Deep venous thrombosis leg, and bilateral  pulmonary embolism.     Follow-up and recommended labs and tests     Follow up with primary care provider, Brooke Pal, within 7 days for hospital follow- up and regarding new diagnosis.  Indication: bilateral pulmonary embolism.  Will need to address birth control options after treatment of thrombus.  Recommend repeat CT chest PE protocol in 3 to 6 months.     Activity    Your activity upon discharge: no lifting, driving, or strenuous exercise for 7 days.     When to contact your care team    Call your primary doctor if you have any of the following:  increased shortness of breath, chest pain, lower extremity swelling.     Diet    Follow this diet upon discharge: Orders Placed This Encounter      Combination Diet Regular Diet Adult       Examination   Physical Exam   Temp:  [97.3  F (36.3  C)-98.1  F (36.7  C)] 98.1  F (36.7  C)  Pulse:  [] 80  Resp:  [20-30] 30  BP: (128-180)/() 128/70  SpO2:  [89 %-97 %] 94 %  Wt Readings from Last 1 Encounters:   04/25/22 140.2 kg (309 lb)       Constitutional: awake, alert, cooperative, no apparent distress, and appears stated age  Respiratory: No increased work of breathing, good air exchange, clear to auscultation bilaterally, no crackles or wheezing  Cardiovascular: Normal apical impulse, regular rate and rhythm, normal S1 and S2, no S3 or S4, and no murmur noted  Skin: no bruising or bleeding  Musculoskeletal: There is no redness, warmth, or swelling of the joints.  Full range of motion noted.  Motor strength is 5 out of 5 all extremities bilaterally.  Tone is normal.  Neurologic: Awake, alert, oriented to name, place and time.  Cranial nerves II-XII are grossly intact.  Motor is 5 out of 5 bilaterally.  Cerebellar finger to nose, heel to shin intact.  Sensory is intact.  Babinski down going, Romberg negative, and gait is normal.  Neuropsychiatric: General: normal, calm and normal eye contact      Please see EMR for more detailed significant labs, imaging,  consultant notes etc.    I, Tate Pena DO, personally saw the patient today and spent greater than 30 minutes discharging this patient.    Tate Pena DO  [facility]    CC:Brooke Pal

## 2022-04-26 NOTE — ED PROVIDER NOTES
EMERGENCY DEPARTMENT ENCOUNTER       ED Course & Medical Decision Making   7:51 PM I met with the patient, obtained an initial history, performed an examination and discussed the plan. PPE worn throughout all interactions with the patient, including gloves, surgical mask, N95 mask, safety glasses, and surgical cap.        I saw and examined the patient.  She states that she does not have any pain right now.  IV was established and she was placed on the monitor.  Diagnostics were ordered.  Certainly there is concern for PE.    ACS is also possible.  We will do some labs and imaging.    I did independently interpret EKG that was done at 1628.  Normal sinus rhythm with a rate of 96 bpm.  Intervals and axis are normal.  There is no significant ST elevation, perhaps some subtle lateral ST depression.  There is only 1 previous for comparison and it was earlier today and similar    Her troponin returns in the upper limits of normal.      CT does show multiple PE and I started her on heparin after discussing this with her.  She has no contraindications.  There is no strain on CT.  Her troponin is still in the normal limits.  BNP is okay.  I do not think she needs lytics or IR at this time.  I spoke to the admitting physician who accepted the patient.  She is stable, updated and in agreement      Critical Care     I, Hammad Moe M.D., have personally provided 35 minutes of critical care time exclusive of time spent on separately billable procedures. Time includes review of laboratory data, radiology results, discussion with consultants, and monitoring for potential decompensation. Interventions were performed as documented above.        Prior to making a final disposition on this patient the results of patient's tests and other diagnostic studies were discussed with the patient. All questions were answered. Patient expressed understanding of the plan and was amenable to it.    Medications - No data to display    Final  Impression     No diagnosis found.        Chief Complaint     Chief Complaint   Patient presents with     Chest Pain     Shortness of Breath       Patient is here with shortness of breath and mid chest pain that started yesterday. D dimer was elevated at the urgent care. She was sent here.       HPI       Germania Cheung is a 41 year old female who presents for evaluation of chest pain and shortness of breath.     The patient reports mild chest pain yesterday morning but reports that when she walked up the stairs the pain increased. She endorses associated shortness of breath. This morning she endorses the same symptoms. The patient also states that she has had tightness in her right calf muscles. She is otherwise in her usual state of health and denies any other concerns at this time.     IRizwan am serving as a scribe to document services personally performed by Hammad Moe M.D. based on my observation and the provider's statements to me. IHammad M.D attest that Rizwan Viveros is acting in a scribe capacity, has observed my performance of the services and has documented them in accordance with my direction.    Past Medical History     Past Medical History:   Diagnosis Date     Nicotine dependence      Postpartum depression      Past Surgical History:   Procedure Laterality Date     FINGER GANGLION CYST EXCISION Left 2009    Third finger MCP joint     LAPAROSCOPIC CHOLECYSTECTOMY  2000     TONSILLECTOMY  1981     Family History   Problem Relation Age of Onset     Alcoholism Paternal Grandfather      Emphysema Paternal Grandfather         smoker     Lung Cancer Paternal Grandfather      Hypertension Paternal Grandfather      Breast Cancer Paternal Aunt 40.00        has had twice     Hypertension Father      Other - See Comments Brother         Hypoglycemia      Other - See Comments Mother         Menorrhagia     Emphysema Maternal Grandfather         smoker     Lung Cancer Maternal  "Grandfather      Brain Cancer Paternal Grandmother          young     No Known Problems Maternal Grandmother      No Known Problems Daughter      Breast Cancer Other       Social History     Tobacco Use     Smoking status: Former Smoker     Years: 8.00     Types: Cigarettes, Cigarettes     Smokeless tobacco: Never Used   Substance Use Topics     Alcohol use: Yes     Comment: Alcoholic Drinks/day: 2-3 drinks per week     Drug use: No       Relevant past medical, surgical, family and social history as documented above, has been reviewed and discussed with patient. No changes or additions, unless otherwise noted in the HPI.    Current Medications     levonorgestrel-ethinyl estradiol (SEASONALE) 0.15-0.03 MG tablet  sertraline (ZOLOFT) 100 MG tablet  triamcinolone (KENALOG) 0.1 % cream        Allergies     No Known Allergies    Review of Systems     Review of Systems   Constitutional: Negative for fever.   Respiratory: Positive for shortness of breath.    Cardiovascular: Negative for chest pain and leg swelling.   Gastrointestinal: Negative for abdominal pain.   All other systems reviewed and are negative.       Remainder of systems reviewed, unless noted in HPI all others negative.    Physical Exam     BP (!) 180/108   Pulse 84   Temp 97.3  F (36.3  C) (Temporal)   Resp 20   Ht 1.753 m (5' 9\")   Wt 136.1 kg (300 lb)   SpO2 96%   BMI 44.30 kg/m      Physical Exam  Vitals and nursing note reviewed.   HENT:      Head: Normocephalic.      Nose: Nose normal.   Cardiovascular:      Rate and Rhythm: Normal rate.   Pulmonary:      Effort: Pulmonary effort is normal.   Musculoskeletal:      Right lower leg: No tenderness. No edema.      Left lower leg: No tenderness. No edema.   Neurological:      Mental Status: She is alert. Mental status is at baseline.   Psychiatric:         Mood and Affect: Mood normal.             Labs & Imaging         Labs Ordered and Resulted from Time of ED Arrival to Time of ED Departure - " No data to display      Results for orders placed or performed during the hospital encounter of 04/25/22   ECG 12-LEAD WITH MUSE (LHE)   Result Value Ref Range    Systolic Blood Pressure  mmHg    Diastolic Blood Pressure  mmHg    Ventricular Rate 96 BPM    Atrial Rate 96 BPM    AR Interval 140 ms    QRS Duration 88 ms     ms    QTc 475 ms    P Axis 51 degrees    R AXIS 52 degrees    T Axis 10 degrees    Interpretation ECG       Sinus rhythm  Nonspecific ST abnormality  Abnormal ECG  When compared with ECG of 25-APR-2022 12:52,  No significant change was found  Confirmed by [facility], : ([id number]),  FRANK NELSON ([id number]) on 4/25/2022 5:07:02 PM         Hammad Moe MD  Emergency Medicine  [facility] EMERGENCY ROOM  [address] [place] [id number] [phone]  4/25/2022       Hammad Moe MD  04/25/22 [id number]

## 2022-04-26 NOTE — ED NOTES
Patient discharge to home discharge instructions reviewed and signed by the patient. First dose of 10 mg Elaquis administered prior to discharge.   John Paul Goldsmith RN  4/26/2022  12:24 PM

## 2022-04-26 NOTE — H&P
"Hospital Medicine Service History and Physical  [facility]: [facility]    Germania Cheung is a 41 year old female who  has a past medical history of Nicotine dependence and Postpartum depression.   Chief Complaint: Dyspnea on exertion    Assessment and Plan  Pulmonary emboli  Oral contraceptive  Continue heparin gtt  Advised weight loss  DVT U/S ordered  No heart strain on CT - check echo  Telemetry monitor  Trop slightly up - repeat pending    BMI 45  Former smoker    DVTP: heparin  Code Status: No Order  Disposition: Inpatient   Estimated body mass index is 45.63 kg/m  as calculated from the following:    Height as of this encounter: 1.753 m (5' 9\").    Weight as of this encounter: 140.2 kg (309 lb).    History of Present Illness  Germania Cheung has had 24 hours of SUAREZ.  Denies sudden onset of chest pain.  Denies history of prior VTE or any FMHx VTE.  Currently taking an oral contraceptive.  She has noticed some swelling of the right leg that for her is pretty chronic after an ankle injury.  She has however had new cramping in the past 24 hours of the right leg/calf.  Works as a teacher in [employer]. Denies pain, just mild chest tightness. No recent travel.  Former smoker, minimal EtOH.  Denies drug use.  ROS + SUAREZ, chest tightness  ROS - abd pain, N/V/D, dysuria, frequency, fever/chills  All other systems reviewed and are negative  In the ED, patient    Appears nad  Anicteric conjunctiva, PERRL  Semi-dry mucous membranes, normal dentition  Trachea midline, no jvd  cta, Respiratory effort normal on ra  rrr 80's, no murmur  Abdomen soft, nondistended, nontender  Right ankle edema, clubbing of nails absent  Skin normal temperature, dry  Normal affect, alert  Vital signs reviewed by me    Wt Readings from Last 4 Encounters:   04/25/22 140.2 kg (309 lb)   04/25/22 140.2 kg (309 lb)   12/27/19 117 kg (258 lb)   08/16/18 131.5 kg (290 lb)        reports that she has quit smoking. Her smoking use included " cigarettes and cigarettes. She quit after 8.00 years of use. She has never used smokeless tobacco. She reports current alcohol use. She reports that she does not use drugs.  family history includes Alcoholism in her paternal grandfather; Brain Cancer in her paternal grandmother; Breast Cancer in an other family member; Breast Cancer (age of onset: 40.00) in her paternal aunt; Emphysema in her maternal grandfather and paternal grandfather; Hypertension in her father and paternal grandfather; Lung Cancer in her maternal grandfather and paternal grandfather; No Known Problems in her daughter and maternal grandmother; Other - See Comments in her brother and mother.   has a past surgical history that includes Finger Ganglion Cyst Excision (Left, 2009); Laparoscopic cholecystectomy (2000); and Tonsillectomy (1981).   No Known Allergies    John Lua MD, MPH  [facility] Medicine  [facility]   Phone: #[phone]

## 2022-05-02 ENCOUNTER — OFFICE VISIT (OUTPATIENT)
Dept: FAMILY MEDICINE | Facility: CLINIC | Age: 42
End: 2022-05-02
Payer: COMMERCIAL

## 2022-05-02 VITALS
DIASTOLIC BLOOD PRESSURE: 78 MMHG | RESPIRATION RATE: 20 BRPM | OXYGEN SATURATION: 97 % | HEART RATE: 78 BPM | BODY MASS INDEX: 45.48 KG/M2 | WEIGHT: 293 LBS | TEMPERATURE: 97.1 F | SYSTOLIC BLOOD PRESSURE: 120 MMHG

## 2022-05-02 DIAGNOSIS — Z09 HOSPITAL DISCHARGE FOLLOW-UP: Primary | ICD-10-CM

## 2022-05-02 DIAGNOSIS — Z12.31 VISIT FOR SCREENING MAMMOGRAM: ICD-10-CM

## 2022-05-02 DIAGNOSIS — I82.451 ACUTE DEEP VEIN THROMBOSIS (DVT) OF RIGHT PERONEAL VEIN (H): ICD-10-CM

## 2022-05-02 DIAGNOSIS — I26.94 MULTIPLE SUBSEGMENTAL PULMONARY EMBOLI WITHOUT ACUTE COR PULMONALE (H): ICD-10-CM

## 2022-05-02 DIAGNOSIS — Z11.59 NEED FOR HEPATITIS C SCREENING TEST: ICD-10-CM

## 2022-05-02 DIAGNOSIS — E66.01 MORBID OBESITY (H): ICD-10-CM

## 2022-05-02 DIAGNOSIS — E28.2 POLYCYSTIC OVARIAN SYNDROME: ICD-10-CM

## 2022-05-02 PROCEDURE — 99213 OFFICE O/P EST LOW 20 MIN: CPT | Performed by: FAMILY MEDICINE

## 2022-05-02 ASSESSMENT — PATIENT HEALTH QUESTIONNAIRE - PHQ9: SUM OF ALL RESPONSES TO PHQ QUESTIONS 1-9: 0

## 2022-05-02 NOTE — PROGRESS NOTES
Office Visit  [facility]  Date of Service: May 2, 2022      Subjective   Germania Cheung is a 41 year old female who presents for   Chief Complaint   Patient presents with     Hospital F/U     PE/DVT  Hospital follow up. Admitted at [facility] 4/25-4/26/22 for PE/DVT.  Treated with apixiban. F  Feeling well. Improved chest and leg discomfort and breathing.   Risk factors: OCP + BMI 45 + Age 40 yo.  No: personal family history of clots/clotting disorder, surgery, prolonged immobility, known recent Covid infection, malignancy.    Stopped OCP. Now menses heavy.    PHQ 5/2/2022   PHQ-9 Total Score 0   Q9: Thoughts of better off dead/self-harm past 2 weeks Not at all     No flowsheet data found. No flowsheet data found.   Objective   /78 (BP Location: Left arm, Patient Position: Sitting, Cuff Size: Adult Large)   Pulse 78   Temp 97.1  F (36.2  C) (Oral)   Resp 20   Wt 139.7 kg (308 lb)   SpO2 97%   BMI 45.48 kg/m   She reports that she has quit smoking. Her smoking use included cigarettes and cigarettes. She quit after 8.00 years of use. She has never used smokeless tobacco.    Gen: Alert, no apparent distress.  No results found for any visits on 05/02/22.    Assessment & Plan       Acute deep vein thrombosis (DVT) of right peroneal vein (H) 04/26/2022 4/26/2022 Occlusive deep vein thrombosis upper calf region of the right peroneal vein.     Multiple subsegmental pulmonary emboli  04/25/2022 4/25/2022 CT: Pulmonary embolism is present bilaterally, large clot burden.  4/26/22 Echo: mild concentric LVH, otherwise normal.      Provoked blood clots. Plan 3 months anticoagulation with apixaban, then evaluation for hypercoaguable disorder.  Contraception: she will discuss it with her . Considering vasectomy.      Order Summary                                                      Hospital discharge follow-up    Acute deep vein thrombosis (DVT) of right  peroneal vein (H)    Multiple subsegmental pulmonary emboli without acute cor pulmonale (H)    Morbid obesity (H)    Need for hepatitis C screening test  -     Hepatitis C Screen Reflex to HCV RNA Quant and Genotype; Future    Visit for screening mammogram  -     MA Screen Bilateral w/[place]; Future    Polycystic ovarian syndrome    Other orders  -     REVIEW OF HEALTH MAINTENANCE PROTOCOL ORDERS            No future appointments.    Completed by: Brooke Pal M.D., [facility]. 5/2/2022 10:09 AM.  This transcription uses voice recognition software, which may contain typographical errors.  MDM: [place] neighborhood: [facility] [id number], language: English.

## 2022-06-14 DIAGNOSIS — F33.0 MAJOR DEPRESSIVE DISORDER, RECURRENT EPISODE, MILD (H): ICD-10-CM

## 2022-06-14 DIAGNOSIS — F41.1 GENERALIZED ANXIETY DISORDER: ICD-10-CM

## 2022-06-14 DIAGNOSIS — F42.9 OBSESSIVE-COMPULSIVE DISORDER: ICD-10-CM

## 2022-06-14 RX ORDER — SERTRALINE HYDROCHLORIDE 100 MG/1
TABLET, FILM COATED ORAL
Qty: 180 TABLET | Refills: 0 | Status: SHIPPED | OUTPATIENT
Start: 2022-06-14 | End: 2022-09-09

## 2022-06-15 NOTE — TELEPHONE ENCOUNTER
"Last Written Prescription Date:  3/26/21  Last Fill Quantity: 180,  # refills: 4  Last office visit provider:  5/2/22     Requested Prescriptions   Pending Prescriptions Disp Refills     sertraline (ZOLOFT) 100 MG tablet [Pharmacy Med Name: SERTRALINE  MG TABLET] 180 tablet 4     Sig: TAKE 2 TABLETS BY MOUTH EVERY DAY       SSRIs Protocol Passed - 6/14/2022 12:35 AM        Passed - PHQ-9 score less than 5 in past 6 months     Please review last PHQ-9 score.           Passed - Medication is active on med list        Passed - Patient is age 18 or older        Passed - No active pregnancy on record        Passed - No positive pregnancy test in last 12 months        Passed - Recent (6 mo) or future (30 days) visit within the authorizing provider's specialty     Patient had office visit in the last 6 months or has a visit in the next 30 days with authorizing provider or within the authorizing provider's specialty.  See \"Patient Info\" tab in inbasket, or \"Choose Columns\" in Meds & Orders section of the refill encounter.                 Sita Barron RN 06/14/22 8:30 PM  "

## 2022-06-16 ENCOUNTER — VIRTUAL VISIT (OUTPATIENT)
Dept: URGENT CARE | Facility: CLINIC | Age: 42
End: 2022-06-16
Payer: COMMERCIAL

## 2022-06-16 DIAGNOSIS — U07.1 COVID: Primary | ICD-10-CM

## 2022-06-16 PROCEDURE — 99213 OFFICE O/P EST LOW 20 MIN: CPT | Mod: CS | Performed by: EMERGENCY MEDICINE

## 2022-06-16 NOTE — PROGRESS NOTES
"Video visit:  Start time: 8:30 AM  Stop time: 8:38 AM  Duration: 8 minutes  Patient location: Home  Provider location  [place] virtual appointment (remote)  Platform for video visit: [employer]      The patient has been notified of following:     \"This video visit will be conducted via a call between you and your physician/provider. We have found that certain health care needs can be provided without the need for a physical exam.  This service lets us provide the care you need with a short phone conversation.  If a prescription is necessary we can send it directly to your pharmacy.  If lab work is needed we can place an order for that and you can then stop by our lab to have the test done at a later time.    Video visits are billed at different rates depending on your insurance coverage. During this emergency period, for some insurers they may be billed the same as an in-person visit.  Please reach out to your insurance provider with any questions.    If during the course of the call the physician/provider feels a telephone visit is not appropriate, you will not be charged for this service.\"    Patient has given verbal consent for video visit?  Yes    What phone number would you like to be contacted at?  [phone]    How would you like to obtain your AVS? MyChart    Subjective   CC: Germania Cheung  is a 41 year old female who presents via phone visit today for the following health issues:   Chief Complaint   Patient presents with     Infection        COVID-19 Symptom Review  How many days ago did these symptoms start? 3    Are any of the following symptoms significant for you?  New or worsening difficulty breathing? Yes    Please describe what kind of difficulty you are having breathing:Mild dyspnea (able to do ADLs without difficulty, mild shortness of breath with activities such as climbing one or two flights of stairs or walking briskly)    Worsening cough? Yes, I am coughing up mucus.    Fever or " chills? No    Headache: no    Sore throat: YES    Chest pain: no    Diarrhea: YES    Body aches? no    What treatments has patient tried?    Does patient live in a nursing home, group home, or shelter? no  Does patient have a way to get food/medications during quarantined? Yes, I have a friend or family member who can help me. and Yes                       Reviewed and updated as needed this visit by Provider                    Review of Systems         Objective    Gen: No acute distress.  Nondyspneic appearing on video visit.  Alert and oriented.              Assessment/Plan:  Patient is a 41-year-old female who is had COVID infection for 3 days.  Symptoms are relatively typical with patient having slight dyspnea only on exertion.     Risk: DVT/PE on Eliquis.  BMI 44.    GFR: Greater than 60.    Patient does request to take paxlovid.  She will be instructed to reduce her Eliquis by 50% for the 5 days of taking paxlovid and then resume normal dose itching.          Hammad Dowling MD

## 2022-06-30 DIAGNOSIS — I82.451 ACUTE DEEP VEIN THROMBOSIS (DVT) OF RIGHT PERONEAL VEIN (H): ICD-10-CM

## 2022-06-30 DIAGNOSIS — I26.94 MULTIPLE SUBSEGMENTAL PULMONARY EMBOLI WITHOUT ACUTE COR PULMONALE (H): ICD-10-CM

## 2022-07-02 ENCOUNTER — MYC REFILL (OUTPATIENT)
Dept: FAMILY MEDICINE | Facility: CLINIC | Age: 42
End: 2022-07-02

## 2022-07-02 DIAGNOSIS — L30.9 ECZEMA: ICD-10-CM

## 2022-07-03 RX ORDER — TRIAMCINOLONE ACETONIDE 1 MG/G
CREAM TOPICAL 2 TIMES DAILY PRN
Qty: 80 G | Refills: 1 | Status: SHIPPED | OUTPATIENT
Start: 2022-07-03 | End: 2024-09-26

## 2022-07-03 NOTE — TELEPHONE ENCOUNTER
"Last Written Prescription Date:  8/16/2018  Last Fill Quantity: 80g  # refills: 1   Last office visit provider:  Brooke Pal MD     Requested Prescriptions   Pending Prescriptions Disp Refills     triamcinolone (KENALOG) 0.1 % external cream 80 g 1     Sig: Apply topically 2 times daily as needed       Topical Steroids and Nonsteroidals Protocol Passed - 7/2/2022  5:28 PM        Passed - Patient is age 6 or older        Passed - Authorizing prescriber's most recent note related to this medication read.     If refill request is for ophthalmic use, please forward request to provider for approval.          Passed - High potency steroid not ordered        Passed - Recent (12 mo) or future (30 days) visit within the authorizing provider's specialty     Patient has had an office visit with the authorizing provider or a provider within the authorizing providers department within the previous 12 mos or has a future within next 30 days. See \"Patient Info\" tab in inbasket, or \"Choose Columns\" in Meds & Orders section of the refill encounter.              Passed - Medication is active on med list             Sulma Landrum RN 07/03/22 8:06 AM  "

## 2022-07-06 ENCOUNTER — HOSPITAL ENCOUNTER (OUTPATIENT)
Dept: MAMMOGRAPHY | Facility: CLINIC | Age: 42
Discharge: HOME | End: 2022-07-06
Attending: FAMILY MEDICINE | Admitting: FAMILY MEDICINE
Payer: COMMERCIAL

## 2022-07-06 DIAGNOSIS — Z12.31 VISIT FOR SCREENING MAMMOGRAM: ICD-10-CM

## 2022-07-06 PROCEDURE — 77067 SCR MAMMO BI INCL CAD: CPT

## 2022-07-17 ENCOUNTER — HEALTH MAINTENANCE LETTER (OUTPATIENT)
Age: 42
End: 2022-07-17

## 2022-08-18 ASSESSMENT — ENCOUNTER SYMPTOMS
PARESTHESIAS: 0
WEAKNESS: 0
HEMATURIA: 0
CONSTIPATION: 0
NAUSEA: 0
JOINT SWELLING: 0
CHILLS: 0
SORE THROAT: 0
NERVOUS/ANXIOUS: 1
FREQUENCY: 0
HEADACHES: 0
EYE PAIN: 0
DYSURIA: 0
COUGH: 0
DIZZINESS: 0
SHORTNESS OF BREATH: 0
HEMATOCHEZIA: 0
ARTHRALGIAS: 0
BREAST MASS: 0
FEVER: 0
MYALGIAS: 0
HEARTBURN: 0
DIARRHEA: 0
PALPITATIONS: 0
ABDOMINAL PAIN: 0

## 2022-08-19 ENCOUNTER — OFFICE VISIT (OUTPATIENT)
Dept: FAMILY MEDICINE | Facility: CLINIC | Age: 42
End: 2022-08-19
Payer: COMMERCIAL

## 2022-08-19 VITALS
HEART RATE: 73 BPM | RESPIRATION RATE: 18 BRPM | BODY MASS INDEX: 41.95 KG/M2 | HEIGHT: 70 IN | SYSTOLIC BLOOD PRESSURE: 133 MMHG | TEMPERATURE: 97.4 F | WEIGHT: 293 LBS | DIASTOLIC BLOOD PRESSURE: 82 MMHG

## 2022-08-19 DIAGNOSIS — I82.451 ACUTE DEEP VEIN THROMBOSIS (DVT) OF RIGHT PERONEAL VEIN (H): ICD-10-CM

## 2022-08-19 DIAGNOSIS — F41.9 ANXIETY: ICD-10-CM

## 2022-08-19 DIAGNOSIS — Z00.00 ROUTINE GENERAL MEDICAL EXAMINATION AT A HEALTH CARE FACILITY: Primary | ICD-10-CM

## 2022-08-19 DIAGNOSIS — Z13.6 SCREENING FOR CARDIOVASCULAR CONDITION: ICD-10-CM

## 2022-08-19 DIAGNOSIS — Z11.59 NEED FOR HEPATITIS C SCREENING TEST: ICD-10-CM

## 2022-08-19 DIAGNOSIS — Z11.59 SCREENING FOR VIRAL DISEASE: ICD-10-CM

## 2022-08-19 DIAGNOSIS — I26.94 MULTIPLE SUBSEGMENTAL PULMONARY EMBOLI WITHOUT ACUTE COR PULMONALE (H): ICD-10-CM

## 2022-08-19 DIAGNOSIS — F32.5 DEPRESSION, MAJOR, IN REMISSION (H): ICD-10-CM

## 2022-08-19 LAB
CHOLEST SERPL-MCNC: 214 MG/DL
D DIMER PPP FEU-MCNC: <0.27 UG/ML FEU (ref 0–0.5)
FACTOR 2 INTERPRETATION: NORMAL
FACTOR V INTERPRETATION: NORMAL
HCV AB SERPL QL IA: NONREACTIVE
HCV AB SERPL QL IA: NONREACTIVE
HDLC SERPL-MCNC: 52 MG/DL
LAB DIRECTOR COMMENTS: NORMAL
LAB DIRECTOR DISCLAIMER: NORMAL
LAB DIRECTOR INTERPRETATION: NORMAL
LAB DIRECTOR METHODOLOGY: NORMAL
LAB DIRECTOR RESULTS: NORMAL
LDLC SERPL CALC-MCNC: 130 MG/DL
NONHDLC SERPL-MCNC: 162 MG/DL
SPECIMEN DESCRIPTION: NORMAL
TRIGL SERPL-MCNC: 162 MG/DL

## 2022-08-19 PROCEDURE — 85303 CLOT INHIBIT PROT C ACTIVITY: CPT | Performed by: FAMILY MEDICINE

## 2022-08-19 PROCEDURE — 86803 HEPATITIS C AB TEST: CPT | Performed by: FAMILY MEDICINE

## 2022-08-19 PROCEDURE — 85379 FIBRIN DEGRADATION QUANT: CPT | Performed by: FAMILY MEDICINE

## 2022-08-19 PROCEDURE — 86803 HEPATITIS C AB TEST: CPT | Mod: 59 | Performed by: FAMILY MEDICINE

## 2022-08-19 PROCEDURE — 90746 HEPB VACCINE 3 DOSE ADULT IM: CPT | Performed by: FAMILY MEDICINE

## 2022-08-19 PROCEDURE — 81240 F2 GENE: CPT | Performed by: FAMILY MEDICINE

## 2022-08-19 PROCEDURE — 85730 THROMBOPLASTIN TIME PARTIAL: CPT | Performed by: FAMILY MEDICINE

## 2022-08-19 PROCEDURE — 85306 CLOT INHIBIT PROT S FREE: CPT | Performed by: FAMILY MEDICINE

## 2022-08-19 PROCEDURE — 36415 COLL VENOUS BLD VENIPUNCTURE: CPT | Performed by: FAMILY MEDICINE

## 2022-08-19 PROCEDURE — 99213 OFFICE O/P EST LOW 20 MIN: CPT | Mod: 25 | Performed by: FAMILY MEDICINE

## 2022-08-19 PROCEDURE — 90471 IMMUNIZATION ADMIN: CPT | Performed by: FAMILY MEDICINE

## 2022-08-19 PROCEDURE — G0452 MOLECULAR PATHOLOGY INTERPR: HCPCS | Mod: 26 | Performed by: PATHOLOGY

## 2022-08-19 PROCEDURE — 86147 CARDIOLIPIN ANTIBODY EA IG: CPT | Performed by: FAMILY MEDICINE

## 2022-08-19 PROCEDURE — 80061 LIPID PANEL: CPT | Performed by: FAMILY MEDICINE

## 2022-08-19 PROCEDURE — 99396 PREV VISIT EST AGE 40-64: CPT | Mod: 25 | Performed by: FAMILY MEDICINE

## 2022-08-19 PROCEDURE — 85613 RUSSELL VIPER VENOM DILUTED: CPT | Performed by: FAMILY MEDICINE

## 2022-08-19 PROCEDURE — 81241 F5 GENE: CPT | Performed by: FAMILY MEDICINE

## 2022-08-19 PROCEDURE — 85390 FIBRINOLYSINS SCREEN I&R: CPT | Performed by: PATHOLOGY

## 2022-08-19 NOTE — PATIENT INSTRUCTIONS
"Consider \"salpingectomy\".    Preventive Health Recommendations  Female Ages 40 to 49    Yearly exam:   See your health care provider every year in order to  Review health changes.   Discuss preventive care.    Review your medicines if your doctor prescribed any.    Get a Pap test every three years (unless you have an abnormal result and your provider advises testing more often).    If you get Pap tests with HPV test, you only need to test every 5 years, unless you have an abnormal result. You do not need a Pap test if your uterus was removed (hysterectomy) and you have not had cancer.    You should be tested each year for STDs (sexually transmitted diseases), if you're at risk.   Ask your doctor if you should have a mammogram.    Have a colonoscopy (test for colon cancer) if someone in your family has had colon cancer or polyps before age 50.     Have a cholesterol test every 5 years.     Have a diabetes test (fasting glucose) after age 45. If you are at risk for diabetes, you should have this test every 3 years.    Shots: Get a flu shot each year. Get a tetanus shot every 10 years.     Nutrition:   Eat at least 5 servings of fruits and vegetables each day.  Eat whole-grain bread, whole-wheat pasta and brown rice instead of white grains and rice.  Get adequate Calcium and Vitamin D.      Lifestyle  Exercise at least 150 minutes a week (an average of 30 minutes a day, 5 days a week). This will help you control your weight and prevent disease.  Limit alcohol to one drink per day.  No smoking.   Wear sunscreen to prevent skin cancer.  See your dentist every six months for an exam and cleaning.  "

## 2022-08-19 NOTE — PROGRESS NOTES
SUBJECTIVE:   CC: Germania Cheung is an 41 year old woman who presents for preventive health visit.     Patient has been advised of split billing requirements and indicates understanding: Yes  Healthy Habits:     Getting at least 3 servings of Calcium per day:  NO    Bi-annual eye exam:  NO    Dental care twice a year:  Yes    Sleep apnea or symptoms of sleep apnea:  None    Diet:  Regular (no restrictions)    Frequency of exercise:  2-3 days/week    Duration of exercise:  15-30 minutes    Taking medications regularly:  Yes    Medication side effects:  None    PHQ-2 Total Score: 0    Additional concerns today:  Yes              Today's PHQ-2 Score:   PHQ-2 ( 1999 Pfizer) 8/18/2022   Q1: Little interest or pleasure in doing things 0   Q2: Feeling down, depressed or hopeless 0   PHQ-2 Score 0   Q1: Little interest or pleasure in doing things Not at all   Q2: Feeling down, depressed or hopeless Not at all   PHQ-2 Score 0       Abuse: Current or Past (Physical, Sexual or Emotional) - No  Do you feel safe in your environment? Yes        Social History     Tobacco Use     Smoking status: Former Smoker     Years: 8.00     Types: Cigarettes     Smokeless tobacco: Never Used   Substance Use Topics     Alcohol use: Yes     Comment: Alcoholic Drinks/day: 2-3 drinks per week     If you drink alcohol do you typically have >3 drinks per day or >7 drinks per week? Yes      Alcohol Use 8/18/2022   Prescreen: >3 drinks/day or >7 drinks/week? No       Reviewed orders with patient.  Reviewed health maintenance and updated orders accordingly - Yes      Breast Cancer Screening:    FHS-7:   Breast CA Risk Assessment (FHS-7) 7/6/2022 8/18/2022   Did any of your first-degree relatives have breast or ovarian cancer? No No   Did any of your relatives have bilateral breast cancer? No Unknown   Did any man in your family have breast cancer? No No   Did any woman in your family have breast and ovarian cancer? No Yes   Did any woman in your  "family have breast cancer before age 50 y? No Yes   Do you have 2 or more relatives with breast and/or ovarian cancer? No Yes   Do you have 2 or more relatives with breast and/or bowel cancer? No Yes         Pertinent mammograms are reviewed under the imaging tab.    PAP / HPV Latest Ref Rng & Units 12/27/2019 7/8/2014   PAP Negative for squamous intraepithelial lesion or malignancy. Negative for squamous intraepithelial lesion or malignancy  Electronically signed by Eliot Cohen CT (ASCP) on 1/6/2020 at 10:35 AM   Negative for squamous intraepithelial lesion or malignancy  Electronically signed by Shreya Baez CT (ASCP) on 7/19/2014 at  8:02 AM     HPV16 NEG Negative -   HPV18 NEG Negative -   HRHPV NEG Negative -     Reviewed and updated as needed this visit by clinical staff   Tobacco  Allergies  Meds                Reviewed and updated as needed this visit by Provider                       Review of Systems       OBJECTIVE:   /82 (BP Location: Left arm, Patient Position: Sitting, Cuff Size: Adult Large)   Pulse 73   Temp 97.4  F (36.3  C) (Temporal)   Resp 18   Ht 1.77 m (5' 9.69\")   Wt 144.7 kg (319 lb)   BMI 46.19 kg/m     Wt Readings from Last 6 Encounters:   08/19/22 144.7 kg (319 lb)   05/02/22 139.7 kg (308 lb)   04/25/22 140.2 kg (309 lb)   04/25/22 140.2 kg (309 lb)   12/27/19 117 kg (258 lb)   08/16/18 131.5 kg (290 lb)       Physical Exam  GENERAL: healthy, alert and no distress  EYES: Eyes grossly normal to inspection, PERRL and conjunctivae and sclerae normal  HENT: ear canals and TM's normal, nose and mouth without ulcers or lesions  NECK: no adenopathy, no asymmetry, masses, or scars and thyroid normal to palpation  RESP: lungs clear to auscultation - no rales, rhonchi or wheezes  BREAST: normal without masses, tenderness or nipple discharge and no palpable axillary masses or adenopathy  CV: regular rate and rhythm, normal S1 S2, no S3 or S4, no murmur, click or rub, " no peripheral edema and peripheral pulses strong  ABDOMEN: soft, nontender, no hepatosplenomegaly, no masses and bowel sounds normal  MS: no gross musculoskeletal defects noted, no edema  SKIN: no suspicious lesions or rashes  NEURO: Normal strength and tone, mentation intact and speech normal  PSYCH: mentation appears normal, affect normal/bright    Diagnostic Test Results:  Labs reviewed in Epic    ASSESSMENT/PLAN:   Germania was seen today for physical.    Diagnoses and all orders for this visit:    Routine general medical examination at a health care facility    Depression, major, in remission (H)    Acute deep vein thrombosis (DVT) of right peroneal vein (H)  -     Protein S Antigen Free; Future  -     Protein C chromogenic; Future  -     Lupus Anticoagulant Panel; Future  -     Factor 5 leiden mutation analysis; Future  -     F2 prothrombin 40420N Mut Anal; Future  -     Cardiolipin Elizabeth IgG and IgM; Future  -     D dimer, quantitative; Standing  -     Protein S Antigen Free  -     Protein C chromogenic  -     Lupus Anticoagulant Panel  -     Cancel: Factor 5 leiden mutation analysis  -     Cancel: F2 prothrombin 29244D Mut Anal  -     Cardiolipin Elizabeth IgG and IgM  -     D dimer, quantitative  -     Factor 2 and 5 mutation analysis    Multiple subsegmental pulmonary emboli without acute cor pulmonale (H)  -     Protein S Antigen Free; Future  -     Protein C chromogenic; Future  -     Lupus Anticoagulant Panel; Future  -     Factor 5 leiden mutation analysis; Future  -     F2 prothrombin 47495Y Mut Anal; Future  -     Cardiolipin Elizabeth IgG and IgM; Future  -     D dimer, quantitative; Standing  -     Protein S Antigen Free  -     Protein C chromogenic  -     Lupus Anticoagulant Panel  -     Cancel: Factor 5 leiden mutation analysis  -     Cancel: F2 prothrombin [id number] Mut Anal  -     Cardiolipin Elizabeth IgG and IgM  -     D dimer, quantitative  -     Factor 2 and 5 mutation analysis    Screening for cardiovascular  "condition  -     Lipid panel reflex to direct LDL Fasting; Future  -     Lipid panel reflex to direct LDL Fasting    Screening for viral disease  -     Hepatitis C antibody; Future  -     Hepatitis C antibody    Anxiety  -     Adult Mental Health  Referral; Future    Need for hepatitis C screening test  -     Hepatitis C Screen Reflex to HCV RNA Quant and Genotype    Other orders  -     HEPATITIS B VACCINE, ADULT, IM            COUNSELING:  Reviewed preventive health counseling, as reflected in patient instructions    Estimated body mass index is 46.19 kg/m  as calculated from the following:    Height as of this encounter: 1.77 m (5' 9.69\").    Weight as of this encounter: 144.7 kg (319 lb).    Weight management plan: Discussed healthy diet and exercise guidelines    She reports that she has quit smoking. Her smoking use included cigarettes. She quit after 8.00 years of use. She has never used smokeless tobacco.      Counseling Resources:  ATP IV Guidelines  Pooled Cohorts Equation Calculator  Breast Cancer Risk Calculator  BRCA-Related Cancer Risk Assessment: FHS-7 Tool  FRAX Risk Assessment  ICSI Preventive Guidelines  Dietary Guidelines for Americans, 2010  USDA's MyPlate  ASA Prophylaxis  Lung CA Screening    Brooke Pal MD  [facility]  "

## 2022-08-22 LAB
CARDIOLIPIN IGG SER IA-ACNC: <2 GPL-U/ML
CARDIOLIPIN IGG SER IA-ACNC: NEGATIVE
CARDIOLIPIN IGM SER IA-ACNC: 3.3 MPL-U/ML
CARDIOLIPIN IGM SER IA-ACNC: NEGATIVE
DRVVT SCREEN RATIO: 0.99
INR PPP: 1.08 (ref 0.85–1.15)
LA PPP-IMP: NEGATIVE
LUPUS INTERPRETATION: NORMAL
PTT RATIO: 1.11
THROMBIN TIME: 16.7 SECONDS (ref 13–19)

## 2022-08-23 LAB
PROT C ACT/NOR PPP CHRO: 141 % (ref 70–170)
PROT S FREE AG ACT/NOR PPP IA: 112 % (ref 55–125)

## 2022-09-09 DIAGNOSIS — F42.9 OBSESSIVE-COMPULSIVE DISORDER: ICD-10-CM

## 2022-09-09 DIAGNOSIS — F33.0 MAJOR DEPRESSIVE DISORDER, RECURRENT EPISODE, MILD (H): ICD-10-CM

## 2022-09-09 DIAGNOSIS — F41.1 GENERALIZED ANXIETY DISORDER: ICD-10-CM

## 2022-09-09 RX ORDER — SERTRALINE HYDROCHLORIDE 100 MG/1
TABLET, FILM COATED ORAL
Qty: 180 TABLET | Refills: 1 | Status: SHIPPED | OUTPATIENT
Start: 2022-09-09 | End: 2023-03-20

## 2022-09-09 NOTE — TELEPHONE ENCOUNTER
"Last Written Prescription Date:  6/14/22  Last Fill Quantity: 180,  # refills: 0   Last office visit provider:  8/19/22     Requested Prescriptions   Pending Prescriptions Disp Refills     sertraline (ZOLOFT) 100 MG tablet [Pharmacy Med Name: SERTRALINE  MG TABLET] 180 tablet 0     Sig: TAKE 2 TABLETS BY MOUTH EVERY DAY       SSRIs Protocol Passed - 9/9/2022  2:27 AM        Passed - PHQ-9 score less than 5 in past 6 months     Please review last PHQ-9 score.           Passed - Medication is active on med list        Passed - Patient is age 18 or older        Passed - No active pregnancy on record        Passed - No positive pregnancy test in last 12 months        Passed - Recent (6 mo) or future (30 days) visit within the authorizing provider's specialty     Patient had office visit in the last 6 months or has a visit in the next 30 days with authorizing provider or within the authorizing provider's specialty.  See \"Patient Info\" tab in inbasket, or \"Choose Columns\" in Meds & Orders section of the refill encounter.                 Sita Barron RN 09/09/22 6:41 PM  "

## 2022-09-18 ENCOUNTER — HEALTH MAINTENANCE LETTER (OUTPATIENT)
Age: 42
End: 2022-09-18

## 2022-12-28 ENCOUNTER — VIRTUAL VISIT (OUTPATIENT)
Dept: PSYCHOLOGY | Facility: CLINIC | Age: 42
End: 2022-12-28
Attending: FAMILY MEDICINE
Payer: COMMERCIAL

## 2022-12-28 ENCOUNTER — DOCUMENTATION ONLY (OUTPATIENT)
Dept: PSYCHOLOGY | Facility: CLINIC | Age: 42
End: 2022-12-28
Payer: COMMERCIAL

## 2022-12-28 DIAGNOSIS — F42.2 MIXED OBSESSIONAL THOUGHTS AND ACTS: Primary | ICD-10-CM

## 2022-12-28 DIAGNOSIS — F41.9 ANXIETY: ICD-10-CM

## 2022-12-28 PROCEDURE — 90837 PSYTX W PT 60 MINUTES: CPT | Mod: GT | Performed by: SOCIAL WORKER

## 2022-12-28 ASSESSMENT — COLUMBIA-SUICIDE SEVERITY RATING SCALE - C-SSRS
ATTEMPT SINCE LAST CONTACT: NO
SUICIDE, SINCE LAST CONTACT: NO
TOTAL  NUMBER OF INTERRUPTED ATTEMPTS SINCE LAST CONTACT: NO
6. HAVE YOU EVER DONE ANYTHING, STARTED TO DO ANYTHING, OR PREPARED TO DO ANYTHING TO END YOUR LIFE?: NO
1. SINCE LAST CONTACT, HAVE YOU WISHED YOU WERE DEAD OR WISHED YOU COULD GO TO SLEEP AND NOT WAKE UP?: NO
TOTAL  NUMBER OF ABORTED OR SELF INTERRUPTED ATTEMPTS SINCE LAST CONTACT: NO
2. HAVE YOU ACTUALLY HAD ANY THOUGHTS OF KILLING YOURSELF?: NO

## 2022-12-28 ASSESSMENT — PATIENT HEALTH QUESTIONNAIRE - PHQ9
10. IF YOU CHECKED OFF ANY PROBLEMS, HOW DIFFICULT HAVE THESE PROBLEMS MADE IT FOR YOU TO DO YOUR WORK, TAKE CARE OF THINGS AT HOME, OR GET ALONG WITH OTHER PEOPLE: NOT DIFFICULT AT ALL
SUM OF ALL RESPONSES TO PHQ QUESTIONS 1-9: 3
SUM OF ALL RESPONSES TO PHQ QUESTIONS 1-9: 3

## 2022-12-28 NOTE — PROGRESS NOTES
"[facility]   Mental Health & Addiction Services     Progress Note - Initial Visit    Patient  Name:  Germania Cheung Date: 2022         Service Type: Individual     Visit Start Time: 11:04 Visit End Time: 12:00    Visit #: 1    Attendees: Client    Service Modality:  Video Visit:      Provider verified identity through the following two step process.  Patient provided:  Patient  and Patient address    Telemedicine Visit: The patient's condition can be safely assessed and treated via synchronous audio and visual telemedicine encounter.      Reason for Telemedicine Visit: Services only offered telehealth    Originating Site (Patient Location): Patient's home    Distant Site (Provider Location): Provider Remote Setting- Home Office    Consent:  The patient/guardian has verbally consented to: the potential risks and benefits of telemedicine (video visit) versus in person care; bill my insurance or make self-payment for services provided; and responsibility for payment of non-covered services.     Patient would like the video invitation sent by:  My Chart    Mode of Communication:  Video Conference via [employer]well    Distant Location (Provider):  Off-site    As the provider I attest to compliance with applicable laws and regulations related to telemedicine.       DATA:   Interactive Complexity: No   Crisis: No     Presenting Concerns/  Current Stressors: Patient was referred by her PCP, Kae GOMEZ MD with [facility]. She reports a long standing history of anxiety and OCD. Has learned how to cope but sometimes, it gets in a away of her programing. 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 Driving around; like going around a block to check if did not hit anything/ curb\" shared having a strong support system has helped. Though things get out of control when there is no structure in her day especially during " "the school break.  Has been using self talk. She uses reality check with the family(her  and her mother). They assure her she is okay. Has a 10 year old daughter. She too knows what she is going through. So she is supportive as well. Sometimes she carefully shares with her HS students; it is an alternative programs so some get it.  Patient is here to  \"Get to the point of trusting my brain\". The next possible time will be on 1/26/2023. Should an opening is noted, writer will notify the patient.      ASSESSMENT:  Mental Status Assessment:  Appearance:   Appropriate   Eye Contact:   Good   Psychomotor Behavior: Normal   Attitude:   Cooperative   Orientation:   Person Place Time Situation  Speech   Rate / Production: Normal/ Responsive   Volume:  Normal   Mood:    Normal  Affect:    Appropriate   Thought Content:  Clear   Thought Form:  Coherent  Logical   Insight:    Good       Safety Issues and Plan for Safety and Risk Management:     [place] Suicide Severity Rating Scale (Short Version)  [place] Suicide Severity Rating (Short Version) 4/25/2022 12/28/2022   Over the past 2 weeks have you felt down, depressed, or hopeless? yes -   Over the past 2 weeks have you had thoughts of killing yourself? no -   Have you ever attempted to kill yourself? no -   1. Wish to be Dead (Since Last Contact) - 0   2. Non-Specific Active Suicidal Thoughts (Since Last Contact) - 0   Actual Attempt (Since Last Contact) - 0   Has subject engaged in non-suicidal self-injurious behavior? (Since Last Contact) - 0   Interrupted Attempts (Since Last Contact) - 0   Aborted or Self-Interrupted Attempt (Since Last Contact) - 0   Preparatory Acts or Behavior (Since Last Contact) - 0   Suicide (Since Last Contact) - 0   Calculated C-SSRS Risk Score (Since Last Contact) - No Risk Indicated     Patient denies current fears or concerns for personal safety.  Patient denies current or recent suicidal ideation or behaviors.  Patient denies current " or recent homicidal ideation or behaviors.  Patient denies current or recent self injurious behavior or ideation.  Patient denies other safety concerns.  Recommended that patient call 911 or go to the local ED should there be a change in any of these risk factors. also use  988- the National Hotline for suicide  Patient reports there are no firearms in the house.     Diagnostic Criteria:  Generalized Anxiety Disorder  B. The person finds it difficult to control the worry.   - Restlessness or feeling keyed up or on edge.    - Being easily fatigued.    - Difficulty concentrating or mind going blank.    - Irritability.    - Sleep disturbance (difficulty falling or staying asleep, or restless unsatisfying sleep).   D. The focus of the anxiety and worry is not confined to features of an Axis I disorder.  E. The anxiety, worry, or physical symptoms cause clinically significant distress or impairment in social, occupational, or other important areas of functioning.   F. The disturbance is not due to the direct physiological effects of a substance (e.g., a drug of abuse, a medication) or a general medical condition (e.g., hyperthyroidism) and does not occur exclusively during a Mood Disorder, a Psychotic Disorder, or a Pervasive Developmental Disorder.  OBSESSIVE-COMPULSIVE DISORDER DSM5 CRITERIA: Obsessive Compulsive Disorder    (1) recurrent and persistent thoughts, impulses, or images that are experienced, at some time during the disturbance, as intrusive and inappropriate and that cause marked anxiety or distress     (2) the thoughts, impulses, or images are not simply excessive worries about real-life problems     (3) the client attempts to ignore or suppress such thoughts, impulses, or images, or to neutralize them with some other thought or action     (4) the client recognizes that the obsessional thoughts, impulses, or images are a product of his or her own mind (not imposed from without as in thought insertion)      (1) repetitive behaviors (e.g., hand washing, ordering, checking) or mental acts (e.g., praying, counting, repeating words silently) that the person feels driven to perform in response to an obsession, or according to rules that must be applied rigidly     (2) the behaviors or mental acts are aimed at preventing or reducing distress or preventing some dreaded event or situation; however, these behaviors or mental acts either are not connected in a realistic way with what they are designed to neutralize or prevent or are clearly excessive   At some point during the course of the disorder, the person has recognized that the obsessions or compulsions are excessive or unreasonable  The obsessions or compulsions cause marked distress, are time consuming (take more than 1 hour a day), or significantly interfere with the person's normal routine, occupational (or academic) functioning, or usual social activities or relationships.   The content of the obsessions or compulsions are not restricted to another Axis I Disorder (e.g., preoccupation with food in the presence of an Eating Disorders; hair pulling in the presence of Trichotillomania; concern with appearance in the presence of Body Dysmorphic Disorder; preoccupation with drugs in the presence of a Substance Use Disorder; preoccupation with having a serious illness in the presence of Hypochondriasis; preoccupation with sexual urges or fantasies in the presence of a Paraphilia; or guilty ruminations in the presence of Major Depressive Disorder).   The disturbance is not due to the direct physiological effects of a substance (e.g., a drug of abuse, a medication) or a general medical condition    DSM5 Diagnoses: (Sustained by DSM5 Criteria Listed Above)  Diagnoses: 300.02 (F41.1) Generalized Anxiety Disorder  300.3 (F42) Obsessive Compulsive Disorder  Psychosocial & Contextual Factors: History of OCD  WHODAS 2.0 (12 item):   WHODAS 2.0 Total Score 12/28/2022   Total Score  12   Total Score MyChart 12     Intervention:   patient was encouraged to continue practice thought challenging, increasing positive self talk.   Collateral Reports Completed:  Routed note to PCP    PLAN: (Homework, other):  1. Provider will continue Diagnostic Assessment.  Patient was given the following to do until next session: continue practice thought challenging, increasing positive self talk. Stay in touch with your support system.    2. Provider recommended the following referrals: Not discussed today    3.  Suicide Risk and Safety Concerns were assessed for Germania Cheung: no SI reported    Benny Guallpa [facility]  December 28, 2022      Answers for HPI/ROS submitted by the patient on 12/28/2022  If you checked off any problems, how difficult have these problems made it for you to do your work, take care of things at home, or get along with other people?: Not difficult at all  PHQ9 TOTAL SCORE: 3

## 2023-01-03 ENCOUNTER — TRANSFERRED RECORDS (OUTPATIENT)
Dept: HEALTH INFORMATION MANAGEMENT | Facility: CLINIC | Age: 43
End: 2023-01-03

## 2023-01-23 ENCOUNTER — DOCUMENTATION ONLY (OUTPATIENT)
Dept: PSYCHOLOGY | Facility: CLINIC | Age: 43
End: 2023-01-23
Payer: COMMERCIAL

## 2023-01-23 NOTE — PROGRESS NOTES
Due to writer's unavailability at the initial scheduled time,  this patient was offered to be seen at 4 pm to avoid a longer wait.  Patient will be available to this appt.

## 2023-01-25 ENCOUNTER — VIRTUAL VISIT (OUTPATIENT)
Dept: PSYCHOLOGY | Facility: CLINIC | Age: 43
End: 2023-01-25
Payer: COMMERCIAL

## 2023-01-25 DIAGNOSIS — F42.2 MIXED OBSESSIONAL THOUGHTS AND ACTS: Primary | ICD-10-CM

## 2023-01-25 PROCEDURE — 90791 PSYCH DIAGNOSTIC EVALUATION: CPT | Mod: GT | Performed by: SOCIAL WORKER

## 2023-01-26 NOTE — PROGRESS NOTES
"    [facility] Counseling    PATIENT'S NAME: Germania Cheung  PREFERRED NAME: Germania  PRONOUNS: she, her, hers  MRN: [id number]  : 1980  ADDRESS: [address] [place], [place] [id number]  [facility]. NUMBER:  [id number]  DATE OF SERVICE: 2023  START TIME: 16:03  END TIME: 17:00  PREFERRED PHONE: [phone]  May we leave a program related message: Yes  SERVICE MODALITY:  Video Visit:      Provider verified identity through the following two step process.  Patient provided:  Patient  and Patient address    Telemedicine Visit: The patient's condition can be safely assessed and treated via synchronous audio and visual telemedicine encounter.      Reason for Telemedicine Visit: Services only offered telehealth    Originating Site (Patient Location): Patient's home    Distant Site (Provider Location): Provider Remote Setting    Consent:  The patient/guardian has verbally consented to: the potential risks and benefits of telemedicine (video visit) versus in person care; bill my insurance or make self-payment for services provided; and responsibility for payment of non-covered services.     Patient would like the video invitation sent by:  My Chart    Mode of Communication:  Video Conference via [employer]    Distant Location (Provider):  Off-site    As the provider I attest to compliance with applicable laws and regulations related to telemedicine.    UNIVERSAL ADULT Mental Health DIAGNOSTIC ASSESSMENT    Identifying Information:  Patient is a 42 year old,  individual.  Patient was referred for an assessment byreferring provider, Brooke Pal MD with [facility].  Patient attended the session alone.    Chief Complaint:   The reason for seeking services at this time is: \"Anxiety and OCD\".  The problem(s) began 98- estimated date, just started her freshman year of college and was officially diagnosed with OCD and anxiety. Had anxiety and OCD somewhere around age 4.    Goal: " "\"Get to the point of trusting my brain\"    Patient has attempted to resolve these concerns in the past through Therapy ; last time in therapy was for Post partum depression  about 10 years. did it for a couple of times; Medication for at least the last 10 years..    Social/Family History:  Patient reported he grew up in [place], [place].  They were raised by biological parents  .  Parents were always together. Parents are still  for the last 47 years.   Patient reported that her childhood was fine, normal.  Patient described her current relationships with family of origin as good.     The patient describes her cultural background as .  Cultural influences and impact on patient's life structure, values, norms, and healthcare: none.  Contextual influences on patient's health include: Contextual Factors: Individual Factors : MH issues( anxiety and OCD).These factors will be addressed in the Preliminary Treatment plan. Patient identified her preferred language to be English. Patient reported he does not need the assistance of an  or other support involved in therapy.     Patient reported had no significant delays in developmental tasks.   Patient's highest education level was graduate school  : [employer]( BA) and MA in  Education   Patient identified the following learning problems: none reported.  Modifications will not be used to assist communication in therapy.  Patient reports he is  able to understand written materials.    Patient reported the following relationship history: 2.  Patient's current relationship status is  for 15 years.   Patient identified her sexual orientation as heterosexual.  Patient reported having 1 child: 10 year old daughter- healthy.. Patient identified partner; parents; pets; friends; co-worker as part of her support system.  Patient identified the quality of these relationships as stable and meaningful.    Patient's current living/housing " situation involves staying in own home/apartment.  The immediate members of family and household include Vahe Wright, ; her daughter, Marybeth ( the d dog) and he report that housing is stable.    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  Patient reports her finances are obtained through employment. Patient does identify finances as a current stressor.      Patient reported that he have not been involved with the legal system.     Patient does not report being under probation/ parole/ jurisdiction. They are not under any current court jurisdiction. .    Patient's Strengths and Limitations:  Patient identified the following strengths or resources that will help them succeed in treatment: exercise routine, friends / good social support, family support, positive work environment, motivation, sense of humor and work ethic. Things that may interfere with the patient's success in treatment include: none identified.     Assessments:  The following assessments were completed by patient for this visit:  PHQ2:   PHQ-2 ( 1999 [employer]) 8/18/2022   Q1: Little interest or pleasure in doing things 0   Q2: Feeling down, depressed or hopeless 0   PHQ-2 Score 0   Q1: Little interest or pleasure in doing things Not at all   Q2: Feeling down, depressed or hopeless Not at all   PHQ-2 Score 0     PHQ9:   PHQ-9 SCORE 12/27/2019 5/2/2022 12/28/2022   PHQ-9 Total Score MyChart - - 3 (Minimal depression)   PHQ-9 Total Score 2 0 3     GAD2:   LLOYD-2 12/28/2022   Feeling nervous, anxious, or on edge 1   Not being able to stop or control worrying 1   LLOYD-2 Total Score 2     GAD7: No flowsheet data found.  CAGE-AID:   CAGE-AID Total Score 12/28/2022   Total Score 0   Total Score MyChart 0 (A total score of 2 or greater is considered clinically significant)     PROMIS 10-Global Health (all questions and answers displayed):   PROMIS 10 12/28/2022   In general, would you say your health is: Very good   In general,  would you say your quality of life is: Very good   In general, how would you rate your physical health? Very good   In general, how would you rate your mental health, including your mood and your ability to think? Good   In general, how would you rate your satisfaction with your social activities and relationships? Good   In general, please rate how well you carry out your usual social activities and roles Good   To what extent are you able to carry out your everyday physical activities such as walking, climbing stairs, carrying groceries, or moving a chair? Completely   How often have you been bothered by emotional problems such as feeling anxious, depressed or irritable? Rarely   How would you rate your fatigue on average? Mild   How would you rate your pain on average?   0 = No Pain  to  10 = Worst Imaginable Pain 2   In general, would you say your health is: 4   In general, would you say your quality of life is: 4   In general, how would you rate your physical health? 4   In general, how would you rate your mental health, including your mood and your ability to think? 3   In general, how would you rate your satisfaction with your social activities and relationships? 3   In general, please rate how well you carry out your usual social activities and roles. (This includes activities at home, at work and in your community, and responsibilities as a parent, child, spouse, employee, friend, etc.) 3   To what extent are you able to carry out your everyday physical activities such as walking, climbing stairs, carrying groceries, or moving a chair? 5   In the past 7 days, how often have you been bothered by emotional problems such as feeling anxious, depressed, or irritable? 2   In the past 7 days, how would you rate your fatigue on average? 2   In the past 7 days, how would you rate your pain on average, where 0 means no pain, and 10 means worst imaginable pain? 2   Global Mental Health Score 14   Global Physical  Health Score 17   PROMIS TOTAL - SUBSCORES 31   Some recent data might be hidden     [place] Suicide Severity Rating Scale (Short Version)  [place] Suicide Severity Rating (Short Version) 4/25/2022 12/28/2022   Over the past 2 weeks have you felt down, depressed, or hopeless? yes -   Over the past 2 weeks have you had thoughts of killing yourself? no -   Have you ever attempted to kill yourself? no -   1. Wish to be Dead (Since Last Contact) - 0   2. Non-Specific Active Suicidal Thoughts (Since Last Contact) - 0   Actual Attempt (Since Last Contact) - 0   Has subject engaged in non-suicidal self-injurious behavior? (Since Last Contact) - 0   Interrupted Attempts (Since Last Contact) - 0   Aborted or Self-Interrupted Attempt (Since Last Contact) - 0   Preparatory Acts or Behavior (Since Last Contact) - 0   Suicide (Since Last Contact) - 0   Calculated C-SSRS Risk Score (Since Last Contact) - No Risk Indicated     Personal and Family Medical History:  Patient does report a family history of mental health concerns.  Patient reports family history includes Alcoholism in her paternal grandfather; Brain Cancer in her paternal grandmother; Breast Cancer in an other family member; Breast Cancer (age of onset: 40) in her paternal aunt; Emphysema in her maternal grandfather and paternal grandfather; Hypertension in her father and paternal grandfather; Lung Cancer in her maternal grandfather and paternal grandfather; No Known Problems in her daughter and maternal grandmother; Other - See Comments in her brother and mother.. Possibly dad has some undiagnosed anxiety .    Patient does report Mental Health Diagnosis and/or Treatment.  Patient Patient reported the following previous diagnoses which include(s): an Anxiety Disorder, Depression and Obsessive Compulsive Disorder.  Patient reported symptoms began Freshman in College- depression.  Anxiety and OCD- not sure, probably these started earlier on.  Patient has received  mental health services in the past: therapy with Providers in the community and primary care provider at [facility]...  Psychiatric Hospitalizations: None.  Patient denies a history of civil commitment.  Patient is receiving other mental health services.        Patient has had a physical exam to rule out medical causes for current symptoms.  Date of last physical exam was within the past year. Symptoms have developed since last physical exam and client was encouraged to follow up with PCP.  . The patient has a [facility] Primary Care Provider, who is named Brooke Pal.  Patient reports no current medical and/or dental concerns.  Patient denies any issues with pain..   There are not significant appetite / nutritional concerns / weight changes.   Patient does report a history of head injury / trauma / cognitive impairment.       Current Outpatient Medications:      acetaminophen (TYLENOL) 500 MG tablet, Take 1-2 tablets (500-1,000 mg) by mouth every 6 hours as needed for headaches (and adjunct with moderate or severe pain or per patient request), Disp: , Rfl:      apixaban ANTICOAGULANT (ELIQUIS) 5 MG tablet, Take 1 tablet (5 mg) by mouth 2 times daily, Disp: 60 tablet, Rfl: 0     fexofenadine (ALLEGRA) 180 MG tablet, Take 180 mg by mouth daily, Disp: , Rfl:      sertraline (ZOLOFT) 100 MG tablet, TAKE 2 TABLETS BY MOUTH EVERY DAY, Disp: 180 tablet, Rfl: 1     triamcinolone (KENALOG) 0.1 % external cream, Apply topically 2 times daily as needed for irritation, Disp: 80 g, Rfl: 1    Medication Adherence:  Patient reports taking.  taking prescribed medications as prescribed.    Patient Allergies:    Allergies   Allergen Reactions     Estrogens Other (See Comments)     PE/DVT April 2022       Medical History:    Past Medical History:   Diagnosis Date     Nicotine dependence     7 pack years     Postpartum depression      Current Mental Status Exam:   Appearance:  Appropriate    Eye Contact:  Good    Psychomotor:  Normal       Gait / station:  no problem  Attitude / Demeanor: Cooperative   Speech      Rate / Production: Normal/ Responsive      Volume:  Normal  volume      Language:  intact  Mood:   Normal  Affect:   Appropriate    Thought Content: Clear   Thought Process: Coherent  Logical       Associations: No loosening of associations  Insight:   Good   Judgment:  Intact   Orientation:  Person Place Time Situation  Attention/concentration: Good    Substance Use:  Patient did not report a family history of substance use concerns; see medical history section for details.  Patient has not received chemical dependency treatment in the past.  Patient has not ever been to detox.      Patient is not currently receiving any chemical dependency treatment.         Substance History of use Age of first use Date of last use     Pattern and duration of use (include amounts and frequency)   Alcohol currently use   16 12/31/2022 1-2 beer or wine  Up to 3 times a week   Cannabis   used in the past 16 09/01/00 1-2 times/month.1-2 Puffs each time.   Amphetamines   never used     REPORTS SUBSTANCE USE: N/A   Cocaine/crack    never used       REPORTS SUBSTANCE USE: N/A   Hallucinogens never used         REPORTS SUBSTANCE USE: N/A   Inhalants never used         REPORTS SUBSTANCE USE: N/A   Heroin never used         REPORTS SUBSTANCE USE: N/A   Other Opiates never used     REPORTS SUBSTANCE USE: N/A   Benzodiazepine   never used     REPORTS SUBSTANCE USE: N/A   Barbiturates never used     REPORTS SUBSTANCE USE: N/A   Over the counter meds never used     REPORTS SUBSTANCE USE: N/A   Caffeine/ coffee or pop currently use 9?   1/25/2023 1 cup and 1 pop a day   Nicotine  used in the past 15 07/01/05 1/2 to a pack a day. For about 7 years.   Other substances not listed above:  Identify:  never used     REPORTS SUBSTANCE USE: N/A     Patient reported the following problems as a result of her substance use: no problems, not  applicable.    Substance Use: No symptoms    Based on the negative CAGE score and clinical interview there  are not indications of drug or alcohol abuse.    Significant Losses / Trauma / Abuse / Neglect Issues:   Patient did not  serve in the .  There are indications or report of significant loss, trauma, abuse or neglect issues related to: are no indications and client denies any losses, trauma, abuse, or neglect concerns.  Concerns for possible neglect are not present.     Safety Assessment:   Patient denies current homicidal ideation and behaviors.  Patient denies current self-injurious ideation and behaviors.    Patient denied risk behaviors associated with substance use.  Patient denies any high risk behaviors associated with mental health symptoms.  Patient reports the following current concerns for her personal safety: None.  Patient reports there are not firearms in the house.       History of Safety Concerns:  Patient denied a history of homicidal ideation.     Patient denied a history of personal safety concerns.    Patient denied a history of assaultive behaviors.    Patient denied a history of sexual assault behaviors.     Patient denied a history of risk behaviors associated with substance use.  Patient denies any history of high risk behaviors associated with mental health symptoms.  Patient reports the following protective factors: forward or future oriented thinking; dedication to family or friends; safe and stable environment; regular sleep; effectively controls impulses; regular physical activity; sense of belonging; purpose; secure attachment; help seeking behaviors when distressed; adherence with prescribed medication; living with other people; daily obligations; structured day; effective problem solving skills; commitment to well being; sense of meaning; positive social skills; strong sense of self worth or esteem; sense of personal control or determination; access to a variety of  clinical interventions and pets    Risk Plan:  See Recommendations for Safety and Risk Management Plan    Review of Symptoms per patient report:   Depression: Excessive or inappropriate guilt  Brooke:  No Symptoms  Psychosis: No Symptoms  Anxiety: Excessive worry, Nervousness and Ruminations  Panic:  Hot or cold flashes  Post Traumatic Stress Disorder:  No Symptoms   Eating Disorder: No Symptoms  ADD / ADHD:  Poor task completion, Poor organizational skills, Distractibility, Interrupts and Restlessness/fidgety- since childhood.  Conduct Disorder: No symptoms  Autism Spectrum Disorder: No symptoms  Obsessive Compulsive Disorder:Checking, Washing and compulsive-    Patient reports the following compulsive behaviors and treatment history: None reported.      Diagnostic Criteria:   Obsessive Compulsive Disorder Criteria: Obsessive Compulsive Disorder    (1) recurrent and persistent thoughts, impulses, or images that are experienced, at some time during the disturbance, as intrusive and inappropriate and that cause marked anxiety or distress     (2) the thoughts, impulses, or images are not simply excessive worries about real-life problems     (3) the client attempts to ignore or suppress such thoughts, impulses, or images, or to neutralize them with some other thought or action     (4) the client recognizes that the obsessional thoughts, impulses, or images are a product of his or her own mind (not imposed from without as in thought insertion)   At some point during the course of the disorder, the person has recognized that the obsessions or compulsions are excessive or unreasonable  The obsessions or compulsions cause marked distress, are time consuming (take more than 1 hour a day), or significantly interfere with the person's normal routine, occupational (or academic) functioning, or usual social activities or relationships.   The content of the obsessions or compulsions are not restricted to another Axis I Disorder  "(e.g., preoccupation with food in the presence of an Eating Disorders; hair pulling in the presence of Trichotillomania; concern with appearance in the presence of Body Dysmorphic Disorder; preoccupation with drugs in the presence of a Substance Use Disorder; preoccupation with having a serious illness in the presence of Hypochondriasis; preoccupation with sexual urges or fantasies in the presence of a Paraphilia; or guilty ruminations in the presence of Major Depressive Disorder).   The disturbance is not due to the direct physiological effects of a substance (e.g., a drug of abuse, a medication) or a general medical condition    Functional Status:  Patient reports the following functional impairments:  home life with family: \"review the ways I do things\", management of the household and or completion of tasks and organization.     Nonprogrammatic care:  Patient is requesting basic services to address current mental health concerns.    Clinical Summary:  1. Reason for assessment: Anxiety, depression, OCD.  2. Psychosocial, Cultural and Contextual Factors: Has learned how to cope with these issues, but sometimes, it gets in a away of her programing. 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 Driving around; like going around a block to check if did not hit anything/ curb\".  3. Principal DSM5 Diagnoses  (Sustained by DSM5 Criteria Listed Above):   300.3 (F42) Obsessive Compulsive Disorder.  4. Other Diagnoses that is relevant to services:  Obsessive-compulsive disorder; Major depressive disorder, recurrent episode, mild (H); and Generalized anxiety disorder.  Noted during the  chart review  5. Provisional Diagnosis:  300.3 (F42) Obsessive Compulsive Disorder as evidenced by Patient's chart review, clinical inventory, clinical screening tools and mental status.   6. Prognosis: Expect Improvement.  7. Likely consequences of symptoms if not treated: " symptoms would get worse. .  8. Client strengths include:  caring, educated, empathetic, employed, has a previous history of therapy, insightful, intelligent, motivated, open to learning, open to suggestions / feedback, responsible parent, support of family, friends and providers, supportive, wants to learn, willing to ask questions, willing to relate to others and work history .     Recommendations:     1. Plan for Safety and Risk Management:   Safety and Risk: Recommended that patient call 911 or go to the local ED should there be a change in any of these risk factors..          Report to child / adult protection services was NA.     2. Patient's identified mental health concerns with a cultural influence will be addressed by patient.     3. Initial Treatment will focus on:    Anxiety - challenge any automatic, thoughts related to obsessive and compulsive disorder..     4. Resources/Service Plan:    services are not indicated.   Modifications to assist communication are not indicated.   Additional disability accommodations are not indicated.      5. Collaboration:   Collaboration / coordination of treatment will be initiated with the following  support professionals: primary care physician.      6.  Referrals:   The following referral(s) will be initiated: discussed possibility for a referral for ADHD testing in coming . Next Scheduled Appointment: 2/23/2023.      A Release of Information has been obtained for the following: no NILAM for F care team.     Emergency Contact was obtained.      Clinical Substantiation/medical necessity for the above recommendations:  Nonprogrammatic care:  Patient is requesting basic services to address current mental health concerns.    7. MADISON: No MADISON- reported     8. Records:   These were reviewed at time of assessment.   Information in this assessment was obtained from the medical record and  provided by patient who is a good historian.  Patient will have open access to  their mental health medical record.    9.   Interactive Complexity: No    Provider Name/ Credentials: COLUMBA Ennis- MICHELLE; [facility] 1/25/2023

## 2023-02-23 ENCOUNTER — VIRTUAL VISIT (OUTPATIENT)
Dept: PSYCHOLOGY | Facility: CLINIC | Age: 43
End: 2023-02-23
Payer: COMMERCIAL

## 2023-02-23 DIAGNOSIS — F41.1 GAD (GENERALIZED ANXIETY DISORDER): ICD-10-CM

## 2023-02-23 DIAGNOSIS — F42.2 MIXED OBSESSIONAL THOUGHTS AND ACTS: Primary | ICD-10-CM

## 2023-02-23 PROCEDURE — 90837 PSYTX W PT 60 MINUTES: CPT | Mod: VID | Performed by: SOCIAL WORKER

## 2023-02-23 ASSESSMENT — ANXIETY QUESTIONNAIRES
GAD7 TOTAL SCORE: 7
GAD7 TOTAL SCORE: 7
3. WORRYING TOO MUCH ABOUT DIFFERENT THINGS: SEVERAL DAYS
IF YOU CHECKED OFF ANY PROBLEMS ON THIS QUESTIONNAIRE, HOW DIFFICULT HAVE THESE PROBLEMS MADE IT FOR YOU TO DO YOUR WORK, TAKE CARE OF THINGS AT HOME, OR GET ALONG WITH OTHER PEOPLE: SOMEWHAT DIFFICULT
2. NOT BEING ABLE TO STOP OR CONTROL WORRYING: SEVERAL DAYS
1. FEELING NERVOUS, ANXIOUS, OR ON EDGE: SEVERAL DAYS
7. FEELING AFRAID AS IF SOMETHING AWFUL MIGHT HAPPEN: SEVERAL DAYS
6. BECOMING EASILY ANNOYED OR IRRITABLE: SEVERAL DAYS
5. BEING SO RESTLESS THAT IT IS HARD TO SIT STILL: SEVERAL DAYS

## 2023-02-23 ASSESSMENT — PATIENT HEALTH QUESTIONNAIRE - PHQ9
SUM OF ALL RESPONSES TO PHQ QUESTIONS 1-9: 0
SUM OF ALL RESPONSES TO PHQ QUESTIONS 1-9: 0
5. POOR APPETITE OR OVEREATING: SEVERAL DAYS

## 2023-02-24 NOTE — PROGRESS NOTES
[facility]                                     Progress Note    Patient Name: Germania Cheung  Date: 2/23/20023         Service Type: Individual      Session Start Time: 15:03  Session End Time: 15:56     Session Length:53    Session #: 1    Attendees: Client    Service Modality:  In-person    DATA  Interactive Complexity: No  Crisis: No     Progress Since Last Session (Related to Symptoms / Goals / Homework):   Symptoms: No change : still experience anxiety around her obessive behaviors    Homework: Partially completed      Episode of Care Goals: Minimal progress - ACTION (Actively working towards change); Intervened by reinforcing change plan / affirming steps taken     Current / Ongoing Stressors and Concerns: Patient reports no change since the last visit which was intake. Discussed the results and the focus on therapy. Patient shared difficulties with OCD. Feels she has been dealing with this for sometimes. Remembered some of the issues that took place some 20 years ago which she now remembers and focus of what might have happened.  Still checks curb when drives to some places to make sure she did not hit anything even though family and even when she has no issue with driving. Needs some assurance from family. She is learning how to trust. Will be focusing on some CBT skills to challenge some ANTs as they present. Her next visit is in 3 weeks.      Treatment Objective(s) Addressed in This Session:   use relaxation strategies at least 3 times per day to reduce the physical symptoms of anxiety     Intervention:     Situation        Automatic Thoughts  Cognitive Distortions      Feelings        Behavior        Questioning Thoughts            Motivational Interviewing    MI Intervention: Co-Developed Goal: Targeting unrealistic thoughts leading to behaviors in the context of OCD, Expressed Empathy/Understanding, Supported Autonomy, Collaboration, Evocation, Permission to raise concern or  advise and Open-ended questions     Change Talk Expressed by the Patient: Committment to change    Provider Response to Change Talk: E - Evoked more info from patient about behavior change, A - Affirmed patient's thoughts, decisions, or attempts at behavior change, R - Reflected patient's change talk and S - Summarized patient's change talk statements      Assessments completed prior to visit:  The following assessments were completed by patient for this visit:  PHQ2:   PHQ-2 ( 1999 [employer]) 8/18/2022   Q1: Little interest or pleasure in doing things 0   Q2: Feeling down, depressed or hopeless 0   PHQ-2 Score 0   Q1: Little interest or pleasure in doing things Not at all   Q2: Feeling down, depressed or hopeless Not at all   PHQ-2 Score 0     PHQ9:   PHQ-9 SCORE 12/27/2019 5/2/2022 12/28/2022 2/23/2023   PHQ-9 Total Score MyChart - - 3 (Minimal depression) 0   PHQ-9 Total Score 2 0 3 0     GAD2:   LLOYD-2 12/28/2022 2/23/2023   Feeling nervous, anxious, or on edge 1 1   Not being able to stop or control worrying 1 1   LLOYD-2 Total Score 2 2     GAD7:   LLOYD-7 SCORE 2/23/2023   Total Score 7     CAGE-AID:   CAGE-AID Total Score 12/28/2022   Total Score 0   Total Score MyChart 0 (A total score of 2 or greater is considered clinically significant)     PROMIS 10-Global Health (all questions and answers displayed):   PROMIS 10 12/28/2022   In general, would you say your health is: Very good   In general, would you say your quality of life is: Very good   In general, how would you rate your physical health? Very good   In general, how would you rate your mental health, including your mood and your ability to think? Good   In general, how would you rate your satisfaction with your social activities and relationships? Good   In general, please rate how well you carry out your usual social activities and roles Good   To what extent are you able to carry out your everyday physical activities such as walking, climbing stairs,  carrying groceries, or moving a chair? Completely   How often have you been bothered by emotional problems such as feeling anxious, depressed or irritable? Rarely   How would you rate your fatigue on average? Mild   How would you rate your pain on average?   0 = No Pain  to  10 = Worst Imaginable Pain 2   In general, would you say your health is: 4   In general, would you say your quality of life is: 4   In general, how would you rate your physical health? 4   In general, how would you rate your mental health, including your mood and your ability to think? 3   In general, how would you rate your satisfaction with your social activities and relationships? 3   In general, please rate how well you carry out your usual social activities and roles. (This includes activities at home, at work and in your community, and responsibilities as a parent, child, spouse, employee, friend, etc.) 3   To what extent are you able to carry out your everyday physical activities such as walking, climbing stairs, carrying groceries, or moving a chair? 5   In the past 7 days, how often have you been bothered by emotional problems such as feeling anxious, depressed, or irritable? 2   In the past 7 days, how would you rate your fatigue on average? 2   In the past 7 days, how would you rate your pain on average, where 0 means no pain, and 10 means worst imaginable pain? 2   Global Mental Health Score 14   Global Physical Health Score 17   PROMIS TOTAL - SUBSCORES 31   Some recent data might be hidden     [place] Suicide Severity Rating Scale (Short Version)  [place] Suicide Severity Rating (Short Version) 4/25/2022 12/28/2022   Over the past 2 weeks have you felt down, depressed, or hopeless? yes -   Over the past 2 weeks have you had thoughts of killing yourself? no -   Have you ever attempted to kill yourself? no -   1. Wish to be Dead (Since Last Contact) - 0   2. Non-Specific Active Suicidal Thoughts (Since Last Contact) - 0   Actual  Attempt (Since Last Contact) - 0   Has subject engaged in non-suicidal self-injurious behavior? (Since Last Contact) - 0   Interrupted Attempts (Since Last Contact) - 0   Aborted or Self-Interrupted Attempt (Since Last Contact) - 0   Preparatory Acts or Behavior (Since Last Contact) - 0   Suicide (Since Last Contact) - 0   Calculated C-SSRS Risk Score (Since Last Contact) - No Risk Indicated         ASSESSMENT: Current Emotional / Mental Status (status of significant symptoms):   Risk status (Self / Other harm or suicidal ideation)   Patient denies current fears or concerns for personal safety.   Patient denies current or recent suicidal ideation or behaviors.   Patient denies current or recent homicidal ideation or behaviors.   Patient denies current or recent self injurious behavior or ideation.   Patient denies other safety concerns.   Patient reports there has been no change in risk factors since their last session.     Patient reports there has been no change in protective factors since their last session.     Recommended that patient call 911 or go to the local ED should there be a change in any of these risk factors.     Appearance:   Appropriate    Eye Contact:   Good    Psychomotor Behavior: Normal    Attitude:   Cooperative    Orientation:   Person Place Time Situation   Speech    Rate / Production: Normal/ Responsive Normal     Volume:  Normal    Mood:    Normal   Affect:    Appropriate    Thought Content:  Clear    Thought Form:  Coherent  Logical    Insight:    Good      Medication Review:   No current psychiatric medications prescribed     Medication Compliance:   Yes     Changes in Health Issues:   None reported     Chemical Use Review:   Substance Use: Chemical use reviewed, no active concerns identified      Tobacco Use: No current tobacco use.      Diagnosis:  1. Mixed obsessional thoughts and acts    2. LLOYD (generalized anxiety disorder)      Collateral Reports Completed:   Routed note to  "PCP    PLAN: (Patient Tasks / Therapist Tasks / Other)  Patient will review her CBT skills sent via my chart in previous visit  Patient's next visit is in 3 weeks.     MICHELLE Ennis  ___________________________________________________________________    Individual Treatment Plan    Patient's Name: Germania Cheung  YOB: 1980    Date of Creation: 2/23/2023    Date Treatment Plan Last Reviewed/Revised: 2/23/2023    DSM5 Diagnoses: 300.02 (F41.1) Generalized Anxiety Disorder or 300.3 (F42) Obsessive Compulsive Disorder     Psychosocial / Contextual Factors: Has learned how to cope with these issues, but sometimes, it gets in a away of her programing. 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 Driving around; like going around a block to check if did not hit anything/ curb\".    PROMIS (reviewed every 90 days): 31    Referral / Collaboration:  Was/were discussed and patient will pursue.    Anticipated number of session for this episode of care: 9-12 sessions  Anticipation frequency of session: Biweekly  Anticipated Duration of each session: 53 or more minutes  Treatment plan will be reviewed in 90 days or when goals have been changed.     MeasurableTreatment Goal(s) related to diagnosis / functional impairment(s)  Goal 1: Patient will reduce the frequency, intensity, and duration of obsessions    I will know I've met my goal when my obsessions and behaviors have reduced at 65 % of the time by the next review.    Objective #A (Patient Action)    Patient will use thought-stopping strategy daily to reduce intrusive thoughts.  Status: New - Date: 2/23/2023   Intervention(s)  Therapist will teach distraction skills. Pratice Mindfullness exercices to distract thoughts and delay actions. .    Objective #B  Patient will use cognitive strategies identified in therapy to challenge anxious thoughts.  Status: New - Date: 2/23/2023 "   Intervention(s)  Therapist will role-play around obsessions. challenge ANTs with patients; providing space to express her anxiety related to obession and offer support.     Patient has reviewed and agreed to the above plan.      MICHELLE Ennis  February 23, 2023  Answers for HPI/ROS submitted by the patient on 2/23/2023  PHQ9 TOTAL SCORE: 0

## 2023-03-10 ENCOUNTER — VIRTUAL VISIT (OUTPATIENT)
Dept: PSYCHOLOGY | Facility: CLINIC | Age: 43
End: 2023-03-10
Payer: COMMERCIAL

## 2023-03-10 DIAGNOSIS — F42.2 MIXED OBSESSIONAL THOUGHTS AND ACTS: Primary | ICD-10-CM

## 2023-03-10 PROCEDURE — 90837 PSYTX W PT 60 MINUTES: CPT | Mod: VID | Performed by: SOCIAL WORKER

## 2023-03-10 ASSESSMENT — PATIENT HEALTH QUESTIONNAIRE - PHQ9
SUM OF ALL RESPONSES TO PHQ QUESTIONS 1-9: 0
10. IF YOU CHECKED OFF ANY PROBLEMS, HOW DIFFICULT HAVE THESE PROBLEMS MADE IT FOR YOU TO DO YOUR WORK, TAKE CARE OF THINGS AT HOME, OR GET ALONG WITH OTHER PEOPLE: NOT DIFFICULT AT ALL
SUM OF ALL RESPONSES TO PHQ QUESTIONS 1-9: 0

## 2023-03-10 NOTE — PROGRESS NOTES
[facility]                                     Progress Note    Patient Name: Germania Cheung  Date:  3/10/2023         Service Type: Individual      Session Start Time: 8:04 Session End Time: 8:57     Session Length:53    Session #: 2    Attendees: Client    Service Modality:  Video Visit:      Provider verified identity through the following two step process.  Patient provided:  Patient is known previously to provider    Telemedicine Visit: The patient's condition can be safely assessed and treated via synchronous audio and visual telemedicine encounter.      Reason for Telemedicine Visit: Services only offered telehealth    Originating Site (Patient Location): Patient's home    Distant Site (Provider Location): [facility]    Consent:  The patient/guardian has verbally consented to: the potential risks and benefits of telemedicine (video visit) versus in person care; bill my insurance or make self-payment for services provided; and responsibility for payment of non-covered services.     Patient would like the video invitation sent by:  My Chart    Mode of Communication:  Video Conference via [employer]    Distant Location (Provider):  On-site    As the provider I attest to compliance with applicable laws and regulations related to telemedicine.    DATA  Interactive Complexity: No  Crisis: No     Progress Since Last Session (Related to Symptoms / Goals / Homework):   Symptoms: Improving notes she is developing her awareness and practicing slowly her skills    Homework: Partially completed      Episode of Care Goals: Satisfactory progress - ACTION (Actively working towards change); Intervened by reinforcing change plan / affirming steps taken     Current / Ongoing Stressors and Concerns:Patient reports she has been doing pretty good noticing her needs. 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 Planning to travel to [place]  "during the school break. Discussed the importance of keeping up with the small steps, some progress. Shared understanding that it is a work in progress and it is possible to have some \" relapses, regression\" along the way. Reviewed the stages of change to understand where she is at herself and give herself some credits for her work. Working on allow herself to seek help when needed. Working on acknowledging any change requires adjustment and redefining happiness. She is going to reflect on today's visit. Will review her coping skills sent via my chart. Her next visit is in 3 weeks.        Treatment Objective(s) Addressed in This Session:   use relaxation strategies at least 3 times per day to reduce the physical symptoms of anxiety     Intervention: Reviewed the skills; reinforced the success.     Situation        Automatic Thoughts  Cognitive Distortions      Feelings        Behavior        Questioning Thoughts          Motivational Interviewing    MI Intervention: Expressed Empathy/Understanding, Supported Autonomy, Collaboration, Evocation, Permission to raise concern or advise and Open-ended questions     Change Talk Expressed by the Patient: Committment to change    Provider Response to Change Talk: E - Evoked more info from patient about behavior change, A - Affirmed patient's thoughts, decisions, or attempts at behavior change, R - Reflected patient's change talk and S - Summarized patient's change talk statements    Assessments completed prior to visit: 1/25/2023    The following assessments were completed by patient for this visit:  PHQ2:   PHQ-2 ( 1999 [employer]) 8/18/2022   Q1: Little interest or pleasure in doing things 0   Q2: Feeling down, depressed or hopeless 0   PHQ-2 Score 0   Q1: Little interest or pleasure in doing things Not at all   Q2: Feeling down, depressed or hopeless Not at all   PHQ-2 Score 0     PHQ9:   PHQ-9 SCORE 12/27/2019 5/2/2022 12/28/2022 2/23/2023 3/10/2023   PHQ-9 Total Score " MyChart - - 3 (Minimal depression) 0 0   PHQ-9 Total Score 2 0 3 0 0     GAD2:   LLOYD-2 12/28/2022 2/23/2023 3/10/2023   Feeling nervous, anxious, or on edge 1 1 1   Not being able to stop or control worrying 1 1 1   LLOYD-2 Total Score 2 2 2     GAD7:   LLOYD-7 SCORE 2/23/2023   Total Score 7     CAGE-AID:   CAGE-AID Total Score 12/28/2022   Total Score 0   Total Score MyChart 0 (A total score of 2 or greater is considered clinically significant)     PROMIS 10-Global Health (all questions and answers displayed):   PROMIS 10 12/28/2022   In general, would you say your health is: Very good   In general, would you say your quality of life is: Very good   In general, how would you rate your physical health? Very good   In general, how would you rate your mental health, including your mood and your ability to think? Good   In general, how would you rate your satisfaction with your social activities and relationships? Good   In general, please rate how well you carry out your usual social activities and roles Good   To what extent are you able to carry out your everyday physical activities such as walking, climbing stairs, carrying groceries, or moving a chair? Completely   How often have you been bothered by emotional problems such as feeling anxious, depressed or irritable? Rarely   How would you rate your fatigue on average? Mild   How would you rate your pain on average?   0 = No Pain  to  10 = Worst Imaginable Pain 2   In general, would you say your health is: 4   In general, would you say your quality of life is: 4   In general, how would you rate your physical health? 4   In general, how would you rate your mental health, including your mood and your ability to think? 3   In general, how would you rate your satisfaction with your social activities and relationships? 3   In general, please rate how well you carry out your usual social activities and roles. (This includes activities at home, at work and in your  community, and responsibilities as a parent, child, spouse, employee, friend, etc.) 3   To what extent are you able to carry out your everyday physical activities such as walking, climbing stairs, carrying groceries, or moving a chair? 5   In the past 7 days, how often have you been bothered by emotional problems such as feeling anxious, depressed, or irritable? 2   In the past 7 days, how would you rate your fatigue on average? 2   In the past 7 days, how would you rate your pain on average, where 0 means no pain, and 10 means worst imaginable pain? 2   Global Mental Health Score 14   Global Physical Health Score 17   PROMIS TOTAL - SUBSCORES 31   Some recent data might be hidden     [place] Suicide Severity Rating Scale (Short Version)  [place] Suicide Severity Rating (Short Version) 4/25/2022 12/28/2022   Over the past 2 weeks have you felt down, depressed, or hopeless? yes -   Over the past 2 weeks have you had thoughts of killing yourself? no -   Have you ever attempted to kill yourself? no -   1. Wish to be Dead (Since Last Contact) - 0   2. Non-Specific Active Suicidal Thoughts (Since Last Contact) - 0   Actual Attempt (Since Last Contact) - 0   Has subject engaged in non-suicidal self-injurious behavior? (Since Last Contact) - 0   Interrupted Attempts (Since Last Contact) - 0   Aborted or Self-Interrupted Attempt (Since Last Contact) - 0   Preparatory Acts or Behavior (Since Last Contact) - 0   Suicide (Since Last Contact) - 0   Calculated C-SSRS Risk Score (Since Last Contact) - No Risk Indicated         ASSESSMENT: Current Emotional / Mental Status (status of significant symptoms):   Risk status (Self / Other harm or suicidal ideation)   Patient denies current fears or concerns for personal safety.   Patient denies current or recent suicidal ideation or behaviors.   Patient denies current or recent homicidal ideation or behaviors.   Patient denies current or recent self injurious behavior or  ideation.   Patient denies other safety concerns.   Patient reports there has been no change in risk factors since their last session.     Patient reports there has been no change in protective factors since their last session.     Recommended that patient call 911 or go to the local ED should there be a change in any of these risk factors.     Appearance:   Appropriate    Eye Contact:   Good    Psychomotor Behavior: Normal    Attitude:   Cooperative    Orientation:   Person Place Time Situation   Speech    Rate / Production: Normal/ Responsive Normal     Volume:  Normal    Mood:    Normal   Affect:    Appropriate    Thought Content:  Clear    Thought Form:  Coherent  Logical    Insight:    Good      Medication Review:   No current psychiatric medications prescribed     Medication Compliance:   Yes     Changes in Health Issues:   None reported     Chemical Use Review:   Substance Use: Chemical use reviewed, no active concerns identified      Tobacco Use: No current tobacco use.      Diagnosis:  1. Mixed obsessional thoughts and acts      Collateral Reports Completed:   Routed note to PCP    PLAN: (Patient Tasks / Therapist Tasks / Other)  Patient will review her CBT skills sent via my chart in previous visit  Patient will review and practice the mindfulness exercises being sent via [employer]  Patient will take some time to reflect on today's visit and keep up with small progress she is noticing  Patient's next visit is in 3 weeks    MICHELLE Ennsi  ___________________________________________________________________    Individual Treatment Plan    Patient's Name: Germania Cheung  YOB: 1980    Date of Creation: 2/23/2023    Date Treatment Plan Last Reviewed/Revised: 2/23/2023    DSM5 Diagnoses: 300.02 (F41.1) Generalized Anxiety Disorder or 300.3 (F42) Obsessive Compulsive Disorder     Psychosocial / Contextual Factors: Has learned how to cope with these issues, but sometimes, it gets in a  "away of her programing. 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 Driving around; like going around a block to check if did not hit anything/ curb\".    PROMIS (reviewed every 90 days): 31    Referral / Collaboration:  Was/were discussed and patient will pursue.    Anticipated number of session for this episode of care: 9-12 sessions  Anticipation frequency of session: Biweekly  Anticipated Duration of each session: 53 or more minutes  Treatment plan will be reviewed in 90 days or when goals have been changed.     MeasurableTreatment Goal(s) related to diagnosis / functional impairment(s)  Goal 1: Patient will reduce the frequency, intensity, and duration of obsessions    I will know I've met my goal when my obsessions and behaviors have reduced at 65 % of the time by the next review.    Objective #A (Patient Action)    Patient will use thought-stopping strategy daily to reduce intrusive thoughts.  Status: New - Date: 2/23/2023   Intervention(s)  Therapist will teach distraction skills. Pratice Mindfullness exercices to distract thoughts and delay actions. .    Objective #B  Patient will use cognitive strategies identified in therapy to challenge anxious thoughts.  Status: New - Date: 2/23/2023   Intervention(s)  Therapist will role-play around obsessions. challenge ANTs with patients; providing space to express her anxiety related to obession and offer support.     Patient has reviewed and agreed to the above plan.      MICHELLE Ennis  February 23, 2023  Answers for HPI/ROS submitted by the patient on 2/23/2023  PHQ9 TOTAL SCORE: 0    Answers for HPI/ROS submitted by the patient on 3/10/2023  If you checked off any problems, how difficult have these problems made it for you to do your work, take care of things at home, or get along with other people?: Not difficult at all  PHQ9 TOTAL SCORE: 0      "

## 2023-03-20 DIAGNOSIS — F42.9 OBSESSIVE-COMPULSIVE DISORDER: ICD-10-CM

## 2023-03-20 DIAGNOSIS — F33.0 MAJOR DEPRESSIVE DISORDER, RECURRENT EPISODE, MILD (H): ICD-10-CM

## 2023-03-20 DIAGNOSIS — F41.1 GENERALIZED ANXIETY DISORDER: ICD-10-CM

## 2023-03-20 RX ORDER — SERTRALINE HYDROCHLORIDE 100 MG/1
TABLET, FILM COATED ORAL
Qty: 180 TABLET | Refills: 1 | Status: SHIPPED | OUTPATIENT
Start: 2023-03-20 | End: 2023-09-20

## 2023-03-20 NOTE — TELEPHONE ENCOUNTER
"Routing refill request to provider for review/approval because:  Patient needs to be seen because:  Not seen in 6 months or next 30 days     Last Written Prescription Date:  9/9/22  Last Fill Quantity: 180,  # refills: 1   Last office visit provider:  8/9/22     Requested Prescriptions   Pending Prescriptions Disp Refills     sertraline (ZOLOFT) 100 MG tablet [Pharmacy Med Name: SERTRALINE  MG TABLET] 180 tablet 1     Sig: TAKE 2 TABLETS BY MOUTH EVERY DAY       SSRIs Protocol Failed - 3/20/2023 11:47 AM        Failed - Recent (6 mo) or future (30 days) visit within the authorizing provider's specialty     Patient had office visit in the last 6 months or has a visit in the next 30 days with authorizing provider or within the authorizing provider's specialty.  See \"Patient Info\" tab in inbasket, or \"Choose Columns\" in Meds & Orders section of the refill encounter.            Passed - PHQ-9 score less than 5 in past 6 months     Please review last PHQ-9 score.           Passed - Medication is active on med list        Passed - Patient is age 18 or older        Passed - No active pregnancy on record        Passed - No positive pregnancy test in last 12 months             SELIN BENITEZ RN 03/20/23 11:48 AM  "

## 2023-04-12 ENCOUNTER — VIRTUAL VISIT (OUTPATIENT)
Dept: PSYCHOLOGY | Facility: CLINIC | Age: 43
End: 2023-04-12
Payer: COMMERCIAL

## 2023-04-12 DIAGNOSIS — F42.2 MIXED OBSESSIONAL THOUGHTS AND ACTS: Primary | ICD-10-CM

## 2023-04-12 PROCEDURE — 90834 PSYTX W PT 45 MINUTES: CPT | Mod: VID | Performed by: SOCIAL WORKER

## 2023-04-12 ASSESSMENT — PATIENT HEALTH QUESTIONNAIRE - PHQ9
SUM OF ALL RESPONSES TO PHQ QUESTIONS 1-9: 2
10. IF YOU CHECKED OFF ANY PROBLEMS, HOW DIFFICULT HAVE THESE PROBLEMS MADE IT FOR YOU TO DO YOUR WORK, TAKE CARE OF THINGS AT HOME, OR GET ALONG WITH OTHER PEOPLE: NOT DIFFICULT AT ALL
SUM OF ALL RESPONSES TO PHQ QUESTIONS 1-9: 2

## 2023-04-12 NOTE — PROGRESS NOTES
[facility]                                     Progress Note    Patient Name: Germania Cheung  Date: 4/12/2023         Service Type: Individual      Session Start Time: 15:02 Session End Time: 15:54     Session Length:52    Session #: 3    Attendees: Client    Service Modality:  Video Visit:      Provider verified identity through the following two step process.  Patient provided:  Patient is known previously to provider    Telemedicine Visit: The patient's condition can be safely assessed and treated via synchronous audio and visual telemedicine encounter.      Reason for Telemedicine Visit: Services only offered telehealth    Originating Site (Patient Location): Patient's home    Distant Site (Provider Location): Provider Remote Setting- Home Office    Consent:  The patient/guardian has verbally consented to: the potential risks and benefits of telemedicine (video visit) versus in person care; bill my insurance or make self-payment for services provided; and responsibility for payment of non-covered services.     Patient would like the video invitation sent by:  My Chart    Mode of Communication:  Video Conference via Amwell    Distant Location (Provider):  On-site    As the provider I attest to compliance with applicable laws and regulations related to telemedicine.    DATA  Interactive Complexity: No  Crisis: No     Progress Since Last Session (Related to Symptoms / Goals / Homework):   Symptoms: Improving notes she is developing her awareness and practicing slowly her skills    Homework: Partially completed      Episode of Care Goals: Satisfactory progress - ACTION (Actively working towards change); Intervened by reinforcing change plan / affirming steps taken     Current / Ongoing Stressors and Concerns: Returned from a vacation in [place]. Shared she and family had a good time. Though feels out of balance trying to adjust after the break. No major issues with her OCD symptoms. 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 Though still work on \"trusting my own brain\" finds some concerns on how she interacts with family. Notes, \" my way, is the best\" which then makes her do almost everything around the house. Will try some tips discussed. Will also develop some rules of the house to increase task sharing. Will try deadline in the process. Will try some outdoor activities. Her next visit is in 2 weeks.      Treatment Objective(s) Addressed in This Session:   use relaxation strategies at least 3 times per day to reduce the physical symptoms of anxiety     Intervention: Reviewed the skills; reinforced the success.     Situation        Automatic Thoughts  Cognitive Distortions      Feelings        Behavior        Questioning Thoughts          Motivational Interviewing    MI Intervention: Expressed Empathy/Understanding, Supported Autonomy, Collaboration, Evocation, Permission to raise concern or advise and Open-ended questions     Change Talk Expressed by the Patient: Committment to change    Provider Response to Change Talk: E - Evoked more info from patient about behavior change, A - Affirmed patient's thoughts, decisions, or attempts at behavior change, R - Reflected patient's change talk and S - Summarized patient's change talk statements    Assessments completed prior to visit: 1/25/2023    The following assessments were completed by patient for this visit:  PHQ2:       8/18/2022     7:26 PM   PHQ-2 ( 1999 [employer])   Q1: Little interest or pleasure in doing things 0   Q2: Feeling down, depressed or hopeless 0   PHQ-2 Score 0   Q1: Little interest or pleasure in doing things Not at all   Q2: Feeling down, depressed or hopeless Not at all   PHQ-2 Score 0     PHQ9:       12/27/2019     2:00 PM 5/2/2022    10:08 AM 12/28/2022    10:00 AM 2/23/2023     2:54 PM 3/10/2023     7:52 AM 4/12/2023     3:00 PM   PHQ-9 SCORE   PHQ-9 Total " Score MyChart   3 (Minimal depression) 0 0 2 (Minimal depression)   PHQ-9 Total Score 2 0 3 0 0 2     GAD2:       12/28/2022    10:10 AM 2/23/2023     2:54 PM 3/10/2023     7:52 AM 4/12/2023     3:00 PM   LLOYD-2   Feeling nervous, anxious, or on edge 1 1 1 1   Not being able to stop or control worrying 1 1 1 1   LLOYD-2 Total Score 2 2 2 2     GAD7:       2/23/2023    10:52 PM   LLOYD-7 SCORE   Total Score 7     CAGE-AID:       12/28/2022    10:12 AM   CAGE-AID Total Score   Total Score 0   Total Score MyChart 0 (A total score of 2 or greater is considered clinically significant)     PROMIS 10-Global Health (all questions and answers displayed):       12/28/2022    10:12 AM 4/12/2023     3:00 PM   PROMIS 10   In general, would you say your health is: Very good Very good   In general, would you say your quality of life is: Very good Very good   In general, how would you rate your physical health? Very good Very good   In general, how would you rate your mental health, including your mood and your ability to think? Good Good   In general, how would you rate your satisfaction with your social activities and relationships? Good Very good   In general, please rate how well you carry out your usual social activities and roles Good Very good   To what extent are you able to carry out your everyday physical activities such as walking, climbing stairs, carrying groceries, or moving a chair? Completely Completely   In the past 7 days, how often have you been bothered by emotional problems such as feeling anxious, depressed, or irritable? Rarely Rarely   In the past 7 days, how would you rate your fatigue on average? Mild Mild   In the past 7 days, how would you rate your pain on average, where 0 means no pain, and 10 means worst imaginable pain? 2 1   In general, would you say your health is: 4 4   In general, would you say your quality of life is: 4 4   In general, how would you rate your physical health? 4 4   In general, how  would you rate your mental health, including your mood and your ability to think? 3 3   In general, how would you rate your satisfaction with your social activities and relationships? 3 4   In general, please rate how well you carry out your usual social activities and roles. (This includes activities at home, at work and in your community, and responsibilities as a parent, child, spouse, employee, friend, etc.) 3 4   To what extent are you able to carry out your everyday physical activities such as walking, climbing stairs, carrying groceries, or moving a chair? 5 5   In the past 7 days, how often have you been bothered by emotional problems such as feeling anxious, depressed, or irritable? 2 2   In the past 7 days, how would you rate your fatigue on average? 2 2   In the past 7 days, how would you rate your pain on average, where 0 means no pain, and 10 means worst imaginable pain? 2 1   Global Mental Health Score 14 15   Global Physical Health Score 17 17   PROMIS TOTAL - SUBSCORES 31 32     Wetzel Suicide Severity Rating Scale (Short Version)      4/25/2022     4:26 PM 12/28/2022    11:51 AM   Wetzel Suicide Severity Rating (Short Version)   Over the past 2 weeks have you felt down, depressed, or hopeless? yes    Over the past 2 weeks have you had thoughts of killing yourself? no    Have you ever attempted to kill yourself? no    1. Wish to be Dead (Since Last Contact)  N   2. Non-Specific Active Suicidal Thoughts (Since Last Contact)  N   Actual Attempt (Since Last Contact)  N   Has subject engaged in non-suicidal self-injurious behavior? (Since Last Contact)  N   Interrupted Attempts (Since Last Contact)  N   Aborted or Self-Interrupted Attempt (Since Last Contact)  N   Preparatory Acts or Behavior (Since Last Contact)  N   Suicide (Since Last Contact)  N   Calculated C-SSRS Risk Score (Since Last Contact)  No Risk Indicated         ASSESSMENT: Current Emotional / Mental Status (status of significant  symptoms):   Risk status (Self / Other harm or suicidal ideation)   Patient denies current fears or concerns for personal safety.   Patient denies current or recent suicidal ideation or behaviors.   Patient denies current or recent homicidal ideation or behaviors.   Patient denies current or recent self injurious behavior or ideation.   Patient denies other safety concerns.   Patient reports there has been no change in risk factors since their last session.     Patient reports there has been no change in protective factors since their last session.     Recommended that patient call 911 or go to the local ED should there be a change in any of these risk factors.     Appearance:   Appropriate    Eye Contact:   Good    Psychomotor Behavior: Normal    Attitude:   Cooperative    Orientation:   Person Place Time Situation   Speech    Rate / Production: Normal/ Responsive Normal     Volume:  Normal    Mood:    Normal   Affect:    Appropriate    Thought Content:  Clear    Thought Form:  Coherent  Logical    Insight:    Good      Medication Review:   No current psychiatric medications prescribed     Medication Compliance:   Yes     Changes in Health Issues:   None reported     Chemical Use Review:   Substance Use: Chemical use reviewed, no active concerns identified      Tobacco Use: No current tobacco use.      Diagnosis:  1. Mixed obsessional thoughts and acts      Collateral Reports Completed:   Routed note to PCP    PLAN: (Patient Tasks / Therapist Tasks / Other)  Patient will review her CBT skills sent via my chart in previous visit  Patient will review and practice the mindfulness exercises being sent via wooju  Patient will take some time to reflect on today's visit around letting go her ways  Patient will develop the family rules with the family  Patient's next visit is in 2 weeks    MICHELLE Ennis  ___________________________________________________________________    Individual Treatment  "Plan    Patient's Name: Germania Cheung  YOB: 1980    Date of Creation: 2/23/2023    Date Treatment Plan Last Reviewed/Revised: 2/23/2023    DSM5 Diagnoses: 300.02 (F41.1) Generalized Anxiety Disorder or 300.3 (F42) Obsessive Compulsive Disorder     Psychosocial / Contextual Factors: Has learned how to cope with these issues, but sometimes, it gets in a away of her programing. 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 Driving around; like going around a block to check if did not hit anything/ curb\".    PROMIS (reviewed every 90 days): 31    Referral / Collaboration:  Was/were discussed and patient will pursue.    Anticipated number of session for this episode of care: 9-12 sessions  Anticipation frequency of session: Biweekly  Anticipated Duration of each session: 53 or more minutes  Treatment plan will be reviewed in 90 days or when goals have been changed.     MeasurableTreatment Goal(s) related to diagnosis / functional impairment(s)  Goal 1: Patient will reduce the frequency, intensity, and duration of obsessions    I will know I've met my goal when my obsessions and behaviors have reduced at 65 % of the time by the next review.    Objective #A (Patient Action)    Patient will use thought-stopping strategy daily to reduce intrusive thoughts.  Status: New - Date: 2/23/2023   Intervention(s)  Therapist will teach distraction skills. Pratice Mindfullness exercices to distract thoughts and delay actions. .    Objective #B  Patient will use cognitive strategies identified in therapy to challenge anxious thoughts.  Status: New - Date: 2/23/2023   Intervention(s)  Therapist will role-play around obsessions. challenge ANTs with patients; providing space to express her anxiety related to obession and offer support.     Patient has reviewed and agreed to the above plan.      MICHELLE Ennis  February 23, 2023    Answers for " HPI/ROS submitted by the patient on 2/23/2023  PHQ9 TOTAL SCORE: 0    Answers for HPI/ROS submitted by the patient on 3/10/2023  If you checked off any problems, how difficult have these problems made it for you to do your work, take care of things at home, or get along with other people?: Not difficult at all  PHQ9 TOTAL SCORE: 0    Answers for HPI/ROS submitted by the patient on 4/12/2023  If you checked off any problems, how difficult have these problems made it for you to do your work, take care of things at home, or get along with other people?: Not difficult at all  PHQ9 TOTAL SCORE: 2

## 2023-04-26 ENCOUNTER — VIRTUAL VISIT (OUTPATIENT)
Dept: PSYCHOLOGY | Facility: CLINIC | Age: 43
End: 2023-04-26
Payer: COMMERCIAL

## 2023-04-26 DIAGNOSIS — F42.2 MIXED OBSESSIONAL THOUGHTS AND ACTS: Primary | ICD-10-CM

## 2023-04-26 PROCEDURE — 90834 PSYTX W PT 45 MINUTES: CPT | Mod: VID | Performed by: SOCIAL WORKER

## 2023-04-26 ASSESSMENT — ANXIETY QUESTIONNAIRES
5. BEING SO RESTLESS THAT IT IS HARD TO SIT STILL: NOT AT ALL
3. WORRYING TOO MUCH ABOUT DIFFERENT THINGS: SEVERAL DAYS
GAD7 TOTAL SCORE: 5
2. NOT BEING ABLE TO STOP OR CONTROL WORRYING: SEVERAL DAYS
IF YOU CHECKED OFF ANY PROBLEMS ON THIS QUESTIONNAIRE, HOW DIFFICULT HAVE THESE PROBLEMS MADE IT FOR YOU TO DO YOUR WORK, TAKE CARE OF THINGS AT HOME, OR GET ALONG WITH OTHER PEOPLE: SOMEWHAT DIFFICULT
7. FEELING AFRAID AS IF SOMETHING AWFUL MIGHT HAPPEN: SEVERAL DAYS
1. FEELING NERVOUS, ANXIOUS, OR ON EDGE: SEVERAL DAYS
6. BECOMING EASILY ANNOYED OR IRRITABLE: NOT AT ALL
GAD7 TOTAL SCORE: 5

## 2023-04-26 NOTE — PROGRESS NOTES
[facility]                                     Progress Note    Patient Name: Germania Cheung  Date: 4/26/2023         Service Type: Individual      Session Start Time: 15:02 Session End Time:15:54     Session Length:52    Session #: 4    Attendees: Client    Service Modality:  Video Visit:      Provider verified identity through the following two step process.  Patient provided:  Patient is known previously to provider    Telemedicine Visit: The patient's condition can be safely assessed and treated via synchronous audio and visual telemedicine encounter.      Reason for Telemedicine Visit: Services only offered telehealth    Originating Site (Patient Location): Patient's home    Distant Site (Provider Location): Provider Remote Setting- Home Office    Consent:  The patient/guardian has verbally consented to: the potential risks and benefits of telemedicine (video visit) versus in person care; bill my insurance or make self-payment for services provided; and responsibility for payment of non-covered services.     Patient would like the video invitation sent by:  My Chart    Mode of Communication:  Video Conference via [employer]    Distant Location (Provider):  Off-site    As the provider I attest to compliance with applicable laws and regulations related to telemedicine.    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 Though notes she is managing fairly well. Has been implementing the tips discussed in session to  engage family in household activities. Patient notes she is making progress communicating with family and releasing control as needed. Patient will continue working on some anxiety related to control and thing she can not control. Will continue working on family rules. Her next visit is in a month unless a sooner opening is found.     Treatment Objective(s) Addressed in This Session:   use relaxation strategies at least 3 times per day to reduce the physical symptoms of anxiety     Intervention: Reviewed the skills; reinforced the success.     Situation        Automatic Thoughts  Cognitive Distortions      Feelings        Behavior        Questioning Thoughts          Motivational Interviewing    MI Intervention: Expressed Empathy/Understanding, Supported Autonomy, Collaboration, Evocation, Permission to raise concern or advise and Open-ended questions     Change Talk Expressed by the Patient: Committment to change    Provider Response to Change Talk: E - Evoked more info from patient about behavior change, A - Affirmed patient's thoughts, decisions, or attempts at behavior change, R - Reflected patient's change talk and S - Summarized patient's change talk statements    Assessments completed prior to visit: 1/25/2023    The following assessments were completed by patient for this visit:  PHQ2:       8/18/2022     7:26 PM   PHQ-2 ( 1999 [employer])   Q1: Little interest or pleasure in doing things 0   Q2: Feeling down, depressed or hopeless 0   PHQ-2 Score 0   Q1: Little interest or pleasure in doing things Not at all   Q2: Feeling down, depressed or hopeless Not at all   PHQ-2 Score 0     PHQ9:       12/27/2019     2:00 PM 5/2/2022    10:08 AM 12/28/2022    10:00 AM 2/23/2023     2:54 PM 3/10/2023     7:52 AM 4/12/2023     3:00 PM 4/26/2023     2:46 PM   PHQ-9 SCORE   PHQ-9 Total Score MyChart   3 (Minimal depression) 0 0 2 (Minimal depression) 0   PHQ-9 Total Score 2 0 3 0 0 2 0     GAD2:       12/28/2022    10:10 AM  2/23/2023     2:54 PM 3/10/2023     7:52 AM 4/12/2023     3:00 PM 4/26/2023     2:47 PM   LLOYD-2   Feeling nervous, anxious, or on edge 1 1 1 1 0   Not being able to stop or control worrying 1 1 1 1 1   LLOYD-2 Total Score 2 2 2 2 1     GAD7:       2/23/2023    10:52 PM 4/26/2023     9:39 PM   LLOYD-7 SCORE   Total Score 7 5     CAGE-AID:       12/28/2022    10:12 AM   CAGE-AID Total Score   Total Score 0   Total Score MyChart 0 (A total score of 2 or greater is considered clinically significant)     PROMIS 10-Global Health (all questions and answers displayed):       12/28/2022    10:12 AM 4/12/2023     3:00 PM 4/26/2023     2:47 PM   PROMIS 10   In general, would you say your health is: Very good Very good Very good   In general, would you say your quality of life is: Very good Very good Very good   In general, how would you rate your physical health? Very good Very good Very good   In general, how would you rate your mental health, including your mood and your ability to think? Good Good Very good   In general, how would you rate your satisfaction with your social activities and relationships? Good Very good Very good   In general, please rate how well you carry out your usual social activities and roles Good Very good Very good   To what extent are you able to carry out your everyday physical activities such as walking, climbing stairs, carrying groceries, or moving a chair? Completely Completely Completely   In the past 7 days, how often have you been bothered by emotional problems such as feeling anxious, depressed, or irritable? Rarely Rarely Rarely   In the past 7 days, how would you rate your fatigue on average? Mild Mild Mild   In the past 7 days, how would you rate your pain on average, where 0 means no pain, and 10 means worst imaginable pain? 2 1 1   In general, would you say your health is: 4 4 4   In general, would you say your quality of life is: 4 4 4   In general, how would you rate your physical  health? 4 4 4   In general, how would you rate your mental health, including your mood and your ability to think? 3 3 4   In general, how would you rate your satisfaction with your social activities and relationships? 3 4 4   In general, please rate how well you carry out your usual social activities and roles. (This includes activities at home, at work and in your community, and responsibilities as a parent, child, spouse, employee, friend, etc.) 3 4 4   To what extent are you able to carry out your everyday physical activities such as walking, climbing stairs, carrying groceries, or moving a chair? 5 5 5   In the past 7 days, how often have you been bothered by emotional problems such as feeling anxious, depressed, or irritable? 2 2 2   In the past 7 days, how would you rate your fatigue on average? 2 2 2   In the past 7 days, how would you rate your pain on average, where 0 means no pain, and 10 means worst imaginable pain? 2 1 1   Global Mental Health Score 14 15 16   Global Physical Health Score 17 17 17   PROMIS TOTAL - SUBSCORES 31 32 33     [place] Suicide Severity Rating Scale (Short Version)      4/25/2022     4:26 PM 12/28/2022    11:51 AM   [place] Suicide Severity Rating (Short Version)   Over the past 2 weeks have you felt down, depressed, or hopeless? yes    Over the past 2 weeks have you had thoughts of killing yourself? no    Have you ever attempted to kill yourself? no    1. Wish to be Dead (Since Last Contact)  N   2. Non-Specific Active Suicidal Thoughts (Since Last Contact)  N   Actual Attempt (Since Last Contact)  N   Has subject engaged in non-suicidal self-injurious behavior? (Since Last Contact)  N   Interrupted Attempts (Since Last Contact)  N   Aborted or Self-Interrupted Attempt (Since Last Contact)  N   Preparatory Acts or Behavior (Since Last Contact)  N   Suicide (Since Last Contact)  N   Calculated C-SSRS Risk Score (Since Last Contact)  No Risk Indicated         ASSESSMENT: Current  Emotional / Mental Status (status of significant symptoms):   Risk status (Self / Other harm or suicidal ideation)   Patient denies current fears or concerns for personal safety.   Patient denies current or recent suicidal ideation or behaviors.   Patient denies current or recent homicidal ideation or behaviors.   Patient denies current or recent self injurious behavior or ideation.   Patient denies other safety concerns.   Patient reports there has been no change in risk factors since their last session.     Patient reports there has been no change in protective factors since their last session.     Recommended that patient call 911 or go to the local ED should there be a change in any of these risk factors.     Appearance:   Appropriate    Eye Contact:   Good    Psychomotor Behavior: Normal    Attitude:   Cooperative    Orientation:   Person Place Time Situation   Speech    Rate / Production: Normal/ Responsive Normal     Volume:  Normal    Mood:    Normal   Affect:    Appropriate    Thought Content:  Clear    Thought Form:  Coherent  Logical    Insight:    Good      Medication Review:   No current psychiatric medications prescribed     Medication Compliance:   Yes     Changes in Health Issues:   None reported     Chemical Use Review:   Substance Use: Chemical use reviewed, no active concerns identified      Tobacco Use: No current tobacco use.      Diagnosis:  1. Mixed obsessional thoughts and acts      Collateral Reports Completed:   Routed note to PCP    PLAN: (Patient Tasks / Therapist Tasks / Other):  Patient will review her CBT skills sent via my chart in previous visit  Patient will review and practice the mindfulness exercises being sent via [employer]  Patient will take some time to reflect on today's visit around letting go her ways  Patient will develop the family rules with the family  Patient will take some walks at least 3 times a week  Patient's next visit is in 2 weeks    Benny Guallpa  "LICSW  ___________________________________________________________________    Individual Treatment Plan    Patient's Name: Germania Cheung  YOB: 1980    Date of Creation: 2/23/2023    Date Treatment Plan Last Reviewed/Revised: 2/23/2023    DSM5 Diagnoses: 300.02 (F41.1) Generalized Anxiety Disorder or 300.3 (F42) Obsessive Compulsive Disorder     Psychosocial / Contextual Factors: Has learned how to cope with these issues, but sometimes, it gets in a away of her programing. 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 Driving around; like going around a block to check if did not hit anything/ curb\".    PROMIS (reviewed every 90 days): 31    Referral / Collaboration:  Was/were discussed and patient will pursue.    Anticipated number of session for this episode of care: 9-12 sessions  Anticipation frequency of session: Biweekly  Anticipated Duration of each session: 53 or more minutes  Treatment plan will be reviewed in 90 days or when goals have been changed.     MeasurableTreatment Goal(s) related to diagnosis / functional impairment(s)  Goal 1: Patient will reduce the frequency, intensity, and duration of obsessions    I will know I've met my goal when my obsessions and behaviors have reduced at 65 % of the time by the next review.    Objective #A (Patient Action)    Patient will use thought-stopping strategy daily to reduce intrusive thoughts.  Status: New - Date: 2/23/2023   Intervention(s)  Therapist will teach distraction skills. Pratice Mindfullness exercices to distract thoughts and delay actions. .    Objective #B  Patient will use cognitive strategies identified in therapy to challenge anxious thoughts.  Status: New - Date: 2/23/2023   Intervention(s)  Therapist will role-play around obsessions. challenge ANTs with patients; providing space to express her anxiety related to obession and offer support.     Patient has " reviewed and agreed to the above plan.      Benny Guallpa LICSW  February 23, 2023    Answers for HPI/ROS submitted by the patient on 2/23/2023  PHQ9 TOTAL SCORE: 0    Answers for HPI/ROS submitted by the patient on 3/10/2023  If you checked off any problems, how difficult have these problems made it for you to do your work, take care of things at home, or get along with other people?: Not difficult at all  PHQ9 TOTAL SCORE: 0    Answers for HPI/ROS submitted by the patient on 4/12/2023  If you checked off any problems, how difficult have these problems made it for you to do your work, take care of things at home, or get along with other people?: Not difficult at all  PHQ9 TOTAL SCORE: 2    Answers for HPI/ROS submitted by the patient on 4/26/2023  PHQ9 TOTAL SCORE: 0

## 2023-06-07 ENCOUNTER — VIRTUAL VISIT (OUTPATIENT)
Dept: PSYCHOLOGY | Facility: CLINIC | Age: 43
End: 2023-06-07
Payer: COMMERCIAL

## 2023-06-07 DIAGNOSIS — F42.2 MIXED OBSESSIONAL THOUGHTS AND ACTS: Primary | ICD-10-CM

## 2023-06-07 PROCEDURE — 90837 PSYTX W PT 60 MINUTES: CPT | Mod: VID | Performed by: SOCIAL WORKER

## 2023-06-07 ASSESSMENT — ANXIETY QUESTIONNAIRES
2. NOT BEING ABLE TO STOP OR CONTROL WORRYING: SEVERAL DAYS
5. BEING SO RESTLESS THAT IT IS HARD TO SIT STILL: NOT AT ALL
1. FEELING NERVOUS, ANXIOUS, OR ON EDGE: SEVERAL DAYS
GAD7 TOTAL SCORE: 4
3. WORRYING TOO MUCH ABOUT DIFFERENT THINGS: SEVERAL DAYS
7. FEELING AFRAID AS IF SOMETHING AWFUL MIGHT HAPPEN: NOT AT ALL
GAD7 TOTAL SCORE: 4
IF YOU CHECKED OFF ANY PROBLEMS ON THIS QUESTIONNAIRE, HOW DIFFICULT HAVE THESE PROBLEMS MADE IT FOR YOU TO DO YOUR WORK, TAKE CARE OF THINGS AT HOME, OR GET ALONG WITH OTHER PEOPLE: NOT DIFFICULT AT ALL
6. BECOMING EASILY ANNOYED OR IRRITABLE: SEVERAL DAYS

## 2023-06-07 ASSESSMENT — COLUMBIA-SUICIDE SEVERITY RATING SCALE - C-SSRS
TOTAL  NUMBER OF ABORTED OR SELF INTERRUPTED ATTEMPTS SINCE LAST CONTACT: NO
6. HAVE YOU EVER DONE ANYTHING, STARTED TO DO ANYTHING, OR PREPARED TO DO ANYTHING TO END YOUR LIFE?: NO
2. HAVE YOU ACTUALLY HAD ANY THOUGHTS OF KILLING YOURSELF?: NO
1. SINCE LAST CONTACT, HAVE YOU WISHED YOU WERE DEAD OR WISHED YOU COULD GO TO SLEEP AND NOT WAKE UP?: NO
ATTEMPT SINCE LAST CONTACT: NO
SUICIDE, SINCE LAST CONTACT: NO
TOTAL  NUMBER OF INTERRUPTED ATTEMPTS SINCE LAST CONTACT: NO

## 2023-06-07 ASSESSMENT — PATIENT HEALTH QUESTIONNAIRE - PHQ9
5. POOR APPETITE OR OVEREATING: NOT AT ALL
SUM OF ALL RESPONSES TO PHQ QUESTIONS 1-9: 1

## 2023-06-07 NOTE — PROGRESS NOTES
[facility]                                     Progress Note    Patient Name: Germania Cheung  Date: 6/07/2023         Service Type: Individual      Session Start Time: 15:15 Session End Time:16:12     Session Length:57    Session #: 5    Attendees: Client    Service Modality:  Video Visit:      Provider verified identity through the following two step process.  Patient provided:  Patient is known previously to provider    Telemedicine Visit: The patient's condition can be safely assessed and treated via synchronous audio and visual telemedicine encounter.      Reason for Telemedicine Visit: Services only offered telehealth    Originating Site (Patient Location): Patient's home    Distant Site (Provider Location): Provider Remote Setting- Home Office    Consent:  The patient/guardian has verbally consented to: the potential risks and benefits of telemedicine (video visit) versus in person care; bill my insurance or make self-payment for services provided; and responsibility for payment of non-covered services.     Patient would like the video invitation sent by:  My Chart    Mode of Communication:  Video Conference via [employer]    Distant Location (Provider):  Off-site    As the provider I attest to compliance with applicable laws and regulations related to telemedicine.    DATA  Interactive Complexity: Yes, visit entailed Interactive Complexity evidenced by: Requested accomodation as today was her last day at work.still needed time to process her thoughts and feelings and evaluate her progress so far.     Crisis: No     Progress Since Last Session (Related to Symptoms / Goals / Homework):   Symptoms: Improving notes she is developing her awareness and practicing slowly her skills    Homework: Achieved / completed to satisfaction      Episode of Care Goals: Achieved / completed to satisfaction - ACTION (Actively working towards change); Intervened by reinforcing change plan / affirming steps  taken     Current / Ongoing Stressors and Concerns: Patient reports things have been better for her. Only busy finishing up with school the last day being today. So needed some accomodation with the time. Had to join at  Late time.  Patient notes her OCD symptoms have been reduced in frequency. Up to 50 % of the time she notes some compulsivity. The goal is to continue to cut down the frequency. Has been figuring out how to spend her Summer break. Think a to do list can help to organize her day/week.  Will involve family in the process. Will continue to practice her CBT skills. Will return on 7/10     Treatment Objective(s) Addressed in This Session:   use relaxation strategies at least 3 times per day to reduce the physical symptoms of anxiety     Intervention: Reviewed the skills; reinforced the success.     Situation        Automatic Thoughts  Cognitive Distortions      Feelings        Behavior        Questioning Thoughts          Motivational Interviewing    MI Intervention: Expressed Empathy/Understanding, Supported Autonomy, Collaboration, Evocation, Permission to raise concern or advise and Open-ended questions     Change Talk Expressed by the Patient: Committment to change    Provider Response to Change Talk: E - Evoked more info from patient about behavior change, A - Affirmed patient's thoughts, decisions, or attempts at behavior change, R - Reflected patient's change talk and S - Summarized patient's change talk statements    Assessments completed prior to visit: 1/25/2023    The following assessments were completed by patient for this visit:  PHQ2:       8/18/2022     7:26 PM   PHQ-2 ( 1999 [employer])   Q1: Little interest or pleasure in doing things 0   Q2: Feeling down, depressed or hopeless 0   PHQ-2 Score 0   Q1: Little interest or pleasure in doing things Not at all   Q2: Feeling down, depressed or hopeless Not at all   PHQ-2 Score 0     PHQ9:       5/2/2022    10:08 AM 12/28/2022    10:00 AM 2/23/2023      2:54 PM 3/10/2023     7:52 AM 4/12/2023     3:00 PM 4/26/2023     2:46 PM 6/7/2023     4:16 PM   PHQ-9 SCORE   PHQ-9 Total Score MyChart  3 (Minimal depression) 0 0 2 (Minimal depression) 0    PHQ-9 Total Score 0 3 0 0 2 0 1     GAD2:       12/28/2022    10:10 AM 2/23/2023     2:54 PM 3/10/2023     7:52 AM 4/12/2023     3:00 PM 4/26/2023     2:47 PM   LLOYD-2   Feeling nervous, anxious, or on edge 1 1 1 1 0   Not being able to stop or control worrying 1 1 1 1 1   LLOYD-2 Total Score 2 2 2 2 1     GAD7:       2/23/2023    10:52 PM 4/26/2023     9:39 PM 6/7/2023     4:16 PM   LLOYD-7 SCORE   Total Score 7 5 4     CAGE-AID:       12/28/2022    10:12 AM 6/7/2023     4:16 PM   CAGE-AID Total Score   Total Score 0 0   Total Score MyChart 0 (A total score of 2 or greater is considered clinically significant)      PROMIS 10-Global Health (all questions and answers displayed):       12/28/2022    10:12 AM 4/12/2023     3:00 PM 4/26/2023     2:47 PM 6/7/2023     4:16 PM   PROMIS 10   In general, would you say your health is: Very good Very good Very good    In general, would you say your quality of life is: Very good Very good Very good    In general, how would you rate your physical health? Very good Very good Very good    In general, how would you rate your mental health, including your mood and your ability to think? Good Good Very good    In general, how would you rate your satisfaction with your social activities and relationships? Good Very good Very good    In general, please rate how well you carry out your usual social activities and roles Good Very good Very good    To what extent are you able to carry out your everyday physical activities such as walking, climbing stairs, carrying groceries, or moving a chair? Completely Completely Completely    In the past 7 days, how often have you been bothered by emotional problems such as feeling anxious, depressed, or irritable? Rarely Rarely Rarely    In the past 7 days, how  would you rate your fatigue on average? Mild Mild Mild    In the past 7 days, how would you rate your pain on average, where 0 means no pain, and 10 means worst imaginable pain? 2 1 1    In general, would you say your health is: 4 4 4 4   In general, would you say your quality of life is: 4 4 4 4   In general, how would you rate your physical health? 4 4 4 4   In general, how would you rate your mental health, including your mood and your ability to think? 3 3 4 4   In general, how would you rate your satisfaction with your social activities and relationships? 3 4 4 4   In general, please rate how well you carry out your usual social activities and roles. (This includes activities at home, at work and in your community, and responsibilities as a parent, child, spouse, employee, friend, etc.) 3 4 4 4   To what extent are you able to carry out your everyday physical activities such as walking, climbing stairs, carrying groceries, or moving a chair? 5 5 5 5   In the past 7 days, how often have you been bothered by emotional problems such as feeling anxious, depressed, or irritable? 2 2 2 2   In the past 7 days, how would you rate your fatigue on average? 2 2 2 2   In the past 7 days, how would you rate your pain on average, where 0 means no pain, and 10 means worst imaginable pain? 2 1 1 0   Global Mental Health Score 14 15 16 16   Global Physical Health Score 17 17 17 18   PROMIS TOTAL - SUBSCORES 31 32 33 34     [place] Suicide Severity Rating Scale (Short Version)      4/25/2022     4:26 PM 12/28/2022    11:51 AM 6/7/2023     4:14 PM   [place] Suicide Severity Rating (Short Version)   Over the past 2 weeks have you felt down, depressed, or hopeless? yes     Over the past 2 weeks have you had thoughts of killing yourself? no     Have you ever attempted to kill yourself? no     1. Wish to be Dead (Since Last Contact)  N N   2. Non-Specific Active Suicidal Thoughts (Since Last Contact)  N N   Actual Attempt (Since  Last Contact)  N N   Has subject engaged in non-suicidal self-injurious behavior? (Since Last Contact)  N N   Interrupted Attempts (Since Last Contact)  N N   Aborted or Self-Interrupted Attempt (Since Last Contact)  N N   Preparatory Acts or Behavior (Since Last Contact)  N N   Suicide (Since Last Contact)  N N   Calculated C-SSRS Risk Score (Since Last Contact)  No Risk Indicated No Risk Indicated         ASSESSMENT: Current Emotional / Mental Status (status of significant symptoms):   Risk status (Self / Other harm or suicidal ideation)   Patient denies current fears or concerns for personal safety.   Patient denies current or recent suicidal ideation or behaviors.   Patient denies current or recent homicidal ideation or behaviors.   Patient denies current or recent self injurious behavior or ideation.   Patient denies other safety concerns.   Patient reports there has been no change in risk factors since their last session.     Patient reports there has been no change in protective factors since their last session.     Recommended that patient call 911 or go to the local ED should there be a change in any of these risk factors.     Appearance:   Appropriate    Eye Contact:   Good    Psychomotor Behavior: Normal    Attitude:   Cooperative    Orientation:   Person Place Time Situation   Speech    Rate / Production: Normal/ Responsive Normal     Volume:  Normal    Mood:    Normal   Affect:    Appropriate    Thought Content:  Clear    Thought Form:  Coherent  Logical    Insight:    Good      Medication Review:   No current psychiatric medications prescribed     Medication Compliance:   Yes     Changes in Health Issues:   None reported     Chemical Use Review:   Substance Use: Chemical use reviewed, no active concerns identified      Tobacco Use: No current tobacco use.      Diagnosis:  1. Mixed obsessional thoughts and acts      Collateral Reports Completed:   Routed note to PCP    PLAN: (Patient Tasks / Therapist  "Tasks / Other):  Patient will review her CBT skills sent via my chart in previous visit  Patient will review and practice the mindfulness exercises being sent via [employer]  Patient will develop the family rules with the family  Patient will take some walks at least 3 times a week  Patient will keep the plan to use a calendar/to do list during Summer time to keep structure  Patient's next visit is on July10th.     Benny Yanceygaregla, LICSW  ___________________________________________________________________    Individual Treatment Plan    Patient's Name: Germania Cheung  YOB: 1980    Date of Creation: 2/23/2023    Date Treatment Plan Last Reviewed/Revised: 6/07/2023    DSM5 Diagnoses: 300.02 (F41.1) Generalized Anxiety Disorder or 300.3 (F42) Obsessive Compulsive Disorder     Psychosocial / Contextual Factors: Has learned how to cope with these issues, but sometimes, it gets in a away of her programing. 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 Driving around; like going around a block to check if did not hit anything/ curb\".    PROMIS (reviewed every 90 days): 31    Referral / Collaboration:  Was/were discussed and patient will pursue.    Anticipated number of session for this episode of care: 9-12 sessions  Anticipation frequency of session: Biweekly  Anticipated Duration of each session: 53 or more minutes  Treatment plan will be reviewed in 90 days or when goals have been changed.     MeasurableTreatment Goal(s) related to diagnosis / functional impairment(s)  Goal 1: Patient will reduce the frequency, intensity, and duration of obsessions    I will know I've met my goal when my obsessions and behaviors have reduced at 75 % of the time by the next review.    Objective #A (Patient Action)    Patient will use thought-stopping strategy daily to reduce intrusive thoughts.  Status: Continued - Date(s): " 6/07/2023  Intervention(s)  Therapist will teach distraction skills. Pratice Mindfullness exercices to distract thoughts and delay actions. .    Objective #B  Patient will use cognitive strategies identified in therapy to challenge anxious thoughts.  Status: Continued - Date(s): 6/07/2023  Intervention(s)  Therapist will role-play around obsessions. challenge ANTs with patients; providing space to express her anxiety related to obession and offer support.     Patient has reviewed and agreed to the above plan.    Benny Guallpa [facility]  June 7th 2023    Answers for HPI/ROS submitted by the patient on 2/23/2023  PHQ9 TOTAL SCORE: 0    Answers for HPI/ROS submitted by the patient on 3/10/2023  If you checked off any problems, how difficult have these problems made it for you to do your work, take care of things at home, or get along with other people?: Not difficult at all  PHQ9 TOTAL SCORE: 0    Answers for HPI/ROS submitted by the patient on 4/12/2023  If you checked off any problems, how difficult have these problems made it for you to do your work, take care of things at home, or get along with other people?: Not difficult at all  PHQ9 TOTAL SCORE: 2    Answers for HPI/ROS submitted by the patient on 4/26/2023  PHQ9 TOTAL SCORE: 0

## 2023-07-10 ENCOUNTER — VIRTUAL VISIT (OUTPATIENT)
Dept: PSYCHOLOGY | Facility: CLINIC | Age: 43
End: 2023-07-10
Payer: COMMERCIAL

## 2023-07-10 DIAGNOSIS — F42.2 MIXED OBSESSIONAL THOUGHTS AND ACTS: Primary | ICD-10-CM

## 2023-07-10 PROCEDURE — 90837 PSYTX W PT 60 MINUTES: CPT | Mod: VID | Performed by: SOCIAL WORKER

## 2023-07-10 ASSESSMENT — PATIENT HEALTH QUESTIONNAIRE - PHQ9
SUM OF ALL RESPONSES TO PHQ QUESTIONS 1-9: 2
SUM OF ALL RESPONSES TO PHQ QUESTIONS 1-9: 2
10. IF YOU CHECKED OFF ANY PROBLEMS, HOW DIFFICULT HAVE THESE PROBLEMS MADE IT FOR YOU TO DO YOUR WORK, TAKE CARE OF THINGS AT HOME, OR GET ALONG WITH OTHER PEOPLE: NOT DIFFICULT AT ALL

## 2023-07-10 NOTE — PROGRESS NOTES
[facility]                                     Progress Note    Patient Name: Germania Cheung  Date:7/10/2023         Service Type: Individual      Session Start Time: 15:03 Session End Time:15:57     Session Length: 54    Session #:6    Attendees: Client    Service Modality:  Video Visit:      Provider verified identity through the following two step process.  Patient provided:  Patient is known previously to provider    Telemedicine Visit: The patient's condition can be safely assessed and treated via synchronous audio and visual telemedicine encounter.      Reason for Telemedicine Visit: Services only offered telehealth    Originating Site (Patient Location): Patient's home    Distant Site (Provider Location): Provider Remote Setting- Home Office    Consent:  The patient/guardian has verbally consented to: the potential risks and benefits of telemedicine (video visit) versus in person care; bill my insurance or make self-payment for services provided; and responsibility for payment of non-covered services.     Patient would like the video invitation sent by:  My Chart    Mode of Communication:  Video Conference via [employer]    Distant Location (Provider):  Off-site    As the provider I attest to compliance with applicable laws and regulations related to telemedicine.    DATA  Interactive Complexity: Yes, visit entailed Interactive Complexity evidenced by: Requested accomodation as today was her last day at work. Still needed time to process her thoughts and feelings and evaluate her progress so far to make a decision whether to close for therapy.     Crisis: No     Progress Since Last Session (Related to Symptoms / Goals / Homework):   Symptoms: Improving notes she is developing her awareness and practicing slowly her skills    Homework: Achieved / completed to satisfaction      Episode of Care Goals: Achieved / completed to satisfaction - ACTION (Actively working towards change); Intervened  by reinforcing change plan / affirming steps taken     Current / Ongoing Stressors and Concerns:  Patient shared she has been doing well; finds it is relaxing to be on break. Has been making every effort to have a structured day that also allows her to relax; finds it difficult to her. When it comes to her OCD, she finds it has been a remarkable journey to get to know herself better and challenge her automatic Negative thinking that leads to compulsivity. Patient notes this has not been happening as in the past. Has been able to use her CBT skills to catch, check and change her thoughts as appropriate. Doing well with her daughter and communicates well with spouse. Has been using the family rules developed using the sample she was given. Her next visit is in August. Might choose to close should she continue to show consistence in the use of her coping skills.     Treatment Objective(s) Addressed in This Session:   use relaxation strategies at least 3 times per day to reduce the physical symptoms of anxiety     Intervention: Reviewed the skills; reinforced the success.     Situation        Automatic Thoughts  Cognitive Distortions      Feelings        Behavior        Questioning Thoughts          Motivational Interviewing    MI Intervention: Expressed Empathy/Understanding, Supported Autonomy, Collaboration, Evocation, Permission to raise concern or advise and Open-ended questions     Change Talk Expressed by the Patient: Committment to change    Provider Response to Change Talk: E - Evoked more info from patient about behavior change, A - Affirmed patient's thoughts, decisions, or attempts at behavior change, R - Reflected patient's change talk and S - Summarized patient's change talk statements    Assessments completed prior to visit: 1/25/2023    The following assessments were completed by patient for this visit:  PHQ2:       8/18/2022     7:26 PM   PHQ-2 ( 1999 [employer])   Q1: Little interest or pleasure in doing  things 0   Q2: Feeling down, depressed or hopeless 0   PHQ-2 Score 0   Q1: Little interest or pleasure in doing things Not at all   Q2: Feeling down, depressed or hopeless Not at all   PHQ-2 Score 0     PHQ9:       12/28/2022    10:00 AM 2/23/2023     2:54 PM 3/10/2023     7:52 AM 4/12/2023     3:00 PM 4/26/2023     2:46 PM 6/7/2023     4:16 PM 7/10/2023     2:58 PM   PHQ-9 SCORE   PHQ-9 Total Score MyChart 3 (Minimal depression) 0 0 2 (Minimal depression) 0  2 (Minimal depression)   PHQ-9 Total Score 3 0 0 2 0 1 2     GAD2:       12/28/2022    10:10 AM 2/23/2023     2:54 PM 3/10/2023     7:52 AM 4/12/2023     3:00 PM 4/26/2023     2:47 PM 7/10/2023     2:58 PM   LLOYD-2   Feeling nervous, anxious, or on edge 1 1 1 1 0 1   Not being able to stop or control worrying 1 1 1 1 1 0   LLOYD-2 Total Score 2 2 2 2 1 1     GAD7:       2/23/2023    10:52 PM 4/26/2023     9:39 PM 6/7/2023     4:16 PM   LLOYD-7 SCORE   Total Score 7 5 4     CAGE-AID:       12/28/2022    10:12 AM 6/7/2023     4:16 PM   CAGE-AID Total Score   Total Score 0 0   Total Score MyChart 0 (A total score of 2 or greater is considered clinically significant)      PROMIS 10-Global Health (all questions and answers displayed):       12/28/2022    10:12 AM 4/12/2023     3:00 PM 4/26/2023     2:47 PM 6/7/2023     4:16 PM   PROMIS 10   In general, would you say your health is: Very good Very good Very good    In general, would you say your quality of life is: Very good Very good Very good    In general, how would you rate your physical health? Very good Very good Very good    In general, how would you rate your mental health, including your mood and your ability to think? Good Good Very good    In general, how would you rate your satisfaction with your social activities and relationships? Good Very good Very good    In general, please rate how well you carry out your usual social activities and roles Good Very good Very good    To what extent are you able to carry  out your everyday physical activities such as walking, climbing stairs, carrying groceries, or moving a chair? Completely Completely Completely    In the past 7 days, how often have you been bothered by emotional problems such as feeling anxious, depressed, or irritable? Rarely Rarely Rarely    In the past 7 days, how would you rate your fatigue on average? Mild Mild Mild    In the past 7 days, how would you rate your pain on average, where 0 means no pain, and 10 means worst imaginable pain? 2 1 1    In general, would you say your health is: 4 4 4 4   In general, would you say your quality of life is: 4 4 4 4   In general, how would you rate your physical health? 4 4 4 4   In general, how would you rate your mental health, including your mood and your ability to think? 3 3 4 4   In general, how would you rate your satisfaction with your social activities and relationships? 3 4 4 4   In general, please rate how well you carry out your usual social activities and roles. (This includes activities at home, at work and in your community, and responsibilities as a parent, child, spouse, employee, friend, etc.) 3 4 4 4   To what extent are you able to carry out your everyday physical activities such as walking, climbing stairs, carrying groceries, or moving a chair? 5 5 5 5   In the past 7 days, how often have you been bothered by emotional problems such as feeling anxious, depressed, or irritable? 2 2 2 2   In the past 7 days, how would you rate your fatigue on average? 2 2 2 2   In the past 7 days, how would you rate your pain on average, where 0 means no pain, and 10 means worst imaginable pain? 2 1 1 0   Global Mental Health Score 14 15 16 16   Global Physical Health Score 17 17 17 18   PROMIS TOTAL - SUBSCORES 31 32 33 34     [place] Suicide Severity Rating Scale (Short Version)      4/25/2022     4:26 PM 12/28/2022    11:51 AM 6/7/2023     4:14 PM   [place] Suicide Severity Rating (Short Version)   Over the past 2  weeks have you felt down, depressed, or hopeless? yes     Over the past 2 weeks have you had thoughts of killing yourself? no     Have you ever attempted to kill yourself? no     1. Wish to be Dead (Since Last Contact)  N N   2. Non-Specific Active Suicidal Thoughts (Since Last Contact)  N N   Actual Attempt (Since Last Contact)  N N   Has subject engaged in non-suicidal self-injurious behavior? (Since Last Contact)  N N   Interrupted Attempts (Since Last Contact)  N N   Aborted or Self-Interrupted Attempt (Since Last Contact)  N N   Preparatory Acts or Behavior (Since Last Contact)  N N   Suicide (Since Last Contact)  N N   Calculated C-SSRS Risk Score (Since Last Contact)  No Risk Indicated No Risk Indicated         ASSESSMENT: Current Emotional / Mental Status (status of significant symptoms):   Risk status (Self / Other harm or suicidal ideation)   Patient denies current fears or concerns for personal safety.   Patient denies current or recent suicidal ideation or behaviors.   Patient denies current or recent homicidal ideation or behaviors.   Patient denies current or recent self injurious behavior or ideation.   Patient denies other safety concerns.   Patient reports there has been no change in risk factors since their last session.     Patient reports there has been no change in protective factors since their last session.     Recommended that patient call 911 or go to the local ED should there be a change in any of these risk factors.     Appearance:   Appropriate    Eye Contact:   Good    Psychomotor Behavior: Normal    Attitude:   Cooperative    Orientation:   Person Place Time Situation   Speech    Rate / Production: Normal/ Responsive Normal     Volume:  Normal    Mood:    Normal   Affect:    Appropriate    Thought Content:  Clear    Thought Form:  Coherent  Logical    Insight:    Good      Medication Review:   No current psychiatric medications prescribed     Medication Compliance:   Yes     Changes in  "Health Issues:   None reported     Chemical Use Review:   Substance Use: Chemical use reviewed, no active concerns identified      Tobacco Use: No current tobacco use.      Diagnosis:  1. Mixed obsessional thoughts and acts      Collateral Reports Completed:   Routed note to PCP    PLAN: (Patient Tasks / Therapist Tasks / Other):   Patient will review her CBT skills sent via my chart in previous visit  Patient will review and practice the mindfulness exercises being sent via [employer]  Patient will develop the family rules with the family  Patient will take some walks at least 3 times a week  Patient will keep the plan to use a calendar/to do list during Summer time to keep structure  Patient will increase her out door activities with family  Patient's next visit is on July 10th.     Benny Guallpa LICSW  ___________________________________________________________________    Individual Treatment Plan    Patient's Name: Germania Cheung  YOB: 1980    Date of Creation: 2/23/2023    Date Treatment Plan Last Reviewed/Revised: 6/07/2023    DSM5 Diagnoses: 300.02 (F41.1) Generalized Anxiety Disorder or 300.3 (F42) Obsessive Compulsive Disorder     Psychosocial / Contextual Factors: Has learned how to cope with these issues, but sometimes, it gets in a away of her programing. 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 Driving around; like going around a block to check if did not hit anything/ curb\".    PROMIS (reviewed every 90 days): 31    Referral / Collaboration:  Was/were discussed and patient will pursue.    Anticipated number of session for this episode of care: 9-12 sessions  Anticipation frequency of session: Biweekly  Anticipated Duration of each session: 53 or more minutes  Treatment plan will be reviewed in 90 days or when goals have been changed.     MeasurableTreatment Goal(s) related to diagnosis / functional " impairment(s)  Goal 1: Patient will reduce the frequency, intensity, and duration of obsessions    I will know I've met my goal when my obsessions and behaviors have reduced at 75 % of the time by the next review.    Objective #A (Patient Action)    Patient will use thought-stopping strategy daily to reduce intrusive thoughts.  Status: Continued - Date(s): 6/07/2023  Intervention(s)  Therapist will teach distraction skills. Pratice Mindfullness exercices to distract thoughts and delay actions. .    Objective #B  Patient will use cognitive strategies identified in therapy to challenge anxious thoughts.  Status: Continued - Date(s): 6/07/2023  Intervention(s)  Therapist will role-play around obsessions. challenge ANTs with patients; providing space to express her anxiety related to obession and offer support.     Patient has reviewed and agreed to the above plan.    Benny Guallpa [facility]  June 7th 2023    Answers for HPI/ROS submitted by the patient on 2/23/2023  PHQ9 TOTAL SCORE: 0    Answers for HPI/ROS submitted by the patient on 3/10/2023  If you checked off any problems, how difficult have these problems made it for you to do your work, take care of things at home, or get along with other people?: Not difficult at all  PHQ9 TOTAL SCORE: 0    Answers for HPI/ROS submitted by the patient on 4/12/2023  If you checked off any problems, how difficult have these problems made it for you to do your work, take care of things at home, or get along with other people?: Not difficult at all  PHQ9 TOTAL SCORE: 2    Answers for HPI/ROS submitted by the patient on 4/26/2023  PHQ9 TOTAL SCORE: 0  Answers for HPI/ROS submitted by the patient on 7/10/2023  If you checked off any problems, how difficult have these problems made it for you to do your work, take care of things at home, or get along with other people?: Not difficult at all  PHQ9 TOTAL SCORE: 2

## 2023-08-18 ENCOUNTER — VIRTUAL VISIT (OUTPATIENT)
Dept: PSYCHOLOGY | Facility: CLINIC | Age: 43
End: 2023-08-18
Payer: COMMERCIAL

## 2023-08-18 DIAGNOSIS — F41.1 GAD (GENERALIZED ANXIETY DISORDER): ICD-10-CM

## 2023-08-18 DIAGNOSIS — F42.2 MIXED OBSESSIONAL THOUGHTS AND ACTS: Primary | ICD-10-CM

## 2023-08-18 PROCEDURE — 90834 PSYTX W PT 45 MINUTES: CPT | Mod: VID | Performed by: SOCIAL WORKER

## 2023-08-18 ASSESSMENT — COLUMBIA-SUICIDE SEVERITY RATING SCALE - C-SSRS
TOTAL  NUMBER OF ABORTED OR SELF INTERRUPTED ATTEMPTS SINCE LAST CONTACT: NO
TOTAL  NUMBER OF INTERRUPTED ATTEMPTS SINCE LAST CONTACT: NO
1. SINCE LAST CONTACT, HAVE YOU WISHED YOU WERE DEAD OR WISHED YOU COULD GO TO SLEEP AND NOT WAKE UP?: NO
6. HAVE YOU EVER DONE ANYTHING, STARTED TO DO ANYTHING, OR PREPARED TO DO ANYTHING TO END YOUR LIFE?: NO
SUICIDE, SINCE LAST CONTACT: NO
2. HAVE YOU ACTUALLY HAD ANY THOUGHTS OF KILLING YOURSELF?: NO
ATTEMPT SINCE LAST CONTACT: NO

## 2023-08-18 ASSESSMENT — PATIENT HEALTH QUESTIONNAIRE - PHQ9
SUM OF ALL RESPONSES TO PHQ QUESTIONS 1-9: 1
SUM OF ALL RESPONSES TO PHQ QUESTIONS 1-9: 1
10. IF YOU CHECKED OFF ANY PROBLEMS, HOW DIFFICULT HAVE THESE PROBLEMS MADE IT FOR YOU TO DO YOUR WORK, TAKE CARE OF THINGS AT HOME, OR GET ALONG WITH OTHER PEOPLE: NOT DIFFICULT AT ALL

## 2023-08-18 NOTE — PROGRESS NOTES
"                       Discharge Summary  Multiple Sessions    Client Name: Germania Cheung MRN#: [id number] YOB: 1980    Discharge Date:   August 18, 2023    Service Modality: Video Visit:      Provider verified identity through the following two step process.    Patient provided:  Patient is known previously to provider    Telemedicine Visit: The patient's condition can be safely assessed and treated via synchronous audio and visual telemedicine encounter.      Reason for Telemedicine Visit: Services only offered telehealth    Originating Site (Patient Location): Patient's home    Distant Site (Provider Location): [facility]    Consent:  The patient/guardian has verbally consented to: the potential risks and benefits of telemedicine (video visit) versus in person care; bill my insurance or make self-payment for services provided; and responsibility for payment of non-covered services.     Patient would like the video invitation sent by:  My Chart    Mode of Communication:  Video Conference via [employer]    Distant Location (Provider):  On-site    As the provider I attest to compliance with applicable laws and regulations related to telemedicine.    Service Type: Individual      Session Start Time: 13:05  Session End Time: 13:50      Session Length: 45 - 50     Session #: 7     Attendees: Client    Focus of Treatment Objective(s):  Client's presenting concerns included: \"Anxiety and OCD\"     Stage of Change at time of Discharge: MAINTENANCE (Working to maintain change, with risk of relapse)    Medication Adherence:  Yes    Chemical Use:  NA    Assessment: Current Emotional / Mental Status (status of significant symptoms):    Risk status (Self / Other harm or suicidal ideation)  Client denies current fears or concerns for personal safety.  Client denies current or recent suicidal ideation or behaviors.  Client denies current or recent homicidal ideation " "or behaviors.  Client denies current or recent self injurious behavior or ideation.  Client denies other safety concerns.  A safety and risk management plan has not been developed at this time, however client was given the after-hours number should there be a change in any of these risk factors.    Appearance:   Appropriate   Eye Contact:   Good   Psychomotor Behavior: Normal   Attitude:   Cooperative   Orientation:   Person Place Time Situation  Speech   Rate / Production: Normal    Volume:  Normal   Mood:    Normal  Affect:    Appropriate   Thought Content:  Clear   Thought Form:  Coherent  Logical   Insight:   Good     DSM5 Diagnoses: (Sustained by DSM5 Criteria Listed Above)  Diagnoses: 300.02 (F41.1) Generalized Anxiety Disorder or 300.3 (F42) Obsessive Compulsive Disorder    Psychosocial & Contextual Factors: OCD gets in a away of her programing. 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 Driving around; like going around a block to check if did not hit anything/ curb\".  Patient has gotten better with these issues. She plans to continue using her coping skills especially her CBT skills. Will be able to challenge any unrealistic thinking. Making sure that she owns her own thoughts  which will allow her to control them, not the other way around when thoughts take over. Currently she is able to challenge negative thoughts. Has done different trips with or without family and managed to get to destination without having to return to check if she has not hit anything at least at 99 % of time. as she has done prior to her therapy    WHODAS 2.0 (12 item) Score: Not needed  [id number]: 34    Reason for Discharge:    Client is satisfied with progress    Aftercare Plan:  Client may resume counseling services at any time in the future by calling the [facility] Intake Office, [phone].    MICHELLE Ennis  August 18, 2023           "

## 2023-09-20 DIAGNOSIS — F42.9 OBSESSIVE-COMPULSIVE DISORDER: ICD-10-CM

## 2023-09-20 DIAGNOSIS — F41.1 GENERALIZED ANXIETY DISORDER: ICD-10-CM

## 2023-09-20 DIAGNOSIS — F33.0 MAJOR DEPRESSIVE DISORDER, RECURRENT EPISODE, MILD (H): ICD-10-CM

## 2023-09-20 RX ORDER — SERTRALINE HYDROCHLORIDE 100 MG/1
TABLET, FILM COATED ORAL
Qty: 180 TABLET | Refills: 1 | Status: SHIPPED | OUTPATIENT
Start: 2023-09-20 | End: 2023-10-02

## 2023-10-02 ENCOUNTER — OFFICE VISIT (OUTPATIENT)
Dept: FAMILY MEDICINE | Facility: CLINIC | Age: 43
End: 2023-10-02
Payer: COMMERCIAL

## 2023-10-02 ENCOUNTER — ANCILLARY PROCEDURE (OUTPATIENT)
Dept: GENERAL RADIOLOGY | Facility: CLINIC | Age: 43
End: 2023-10-02
Attending: FAMILY MEDICINE
Payer: COMMERCIAL

## 2023-10-02 VITALS
WEIGHT: 293 LBS | BODY MASS INDEX: 41.95 KG/M2 | DIASTOLIC BLOOD PRESSURE: 76 MMHG | HEART RATE: 72 BPM | TEMPERATURE: 97.5 F | SYSTOLIC BLOOD PRESSURE: 122 MMHG | HEIGHT: 70 IN | OXYGEN SATURATION: 98 % | RESPIRATION RATE: 21 BRPM

## 2023-10-02 DIAGNOSIS — F33.0 MAJOR DEPRESSIVE DISORDER, RECURRENT EPISODE, MILD (H): ICD-10-CM

## 2023-10-02 DIAGNOSIS — M19.041 PRIMARY OSTEOARTHRITIS OF RIGHT HAND: ICD-10-CM

## 2023-10-02 DIAGNOSIS — E28.2 POLYCYSTIC OVARIAN SYNDROME: ICD-10-CM

## 2023-10-02 DIAGNOSIS — F41.1 GENERALIZED ANXIETY DISORDER: ICD-10-CM

## 2023-10-02 DIAGNOSIS — Z00.00 ROUTINE GENERAL MEDICAL EXAMINATION AT A HEALTH CARE FACILITY: Primary | ICD-10-CM

## 2023-10-02 DIAGNOSIS — R20.0 NUMBNESS OF RIGHT HAND: ICD-10-CM

## 2023-10-02 DIAGNOSIS — I26.94 MULTIPLE SUBSEGMENTAL PULMONARY EMBOLI WITHOUT ACUTE COR PULMONALE (H): ICD-10-CM

## 2023-10-02 DIAGNOSIS — Z12.31 VISIT FOR SCREENING MAMMOGRAM: ICD-10-CM

## 2023-10-02 DIAGNOSIS — F42.2 MIXED OBSESSIONAL THOUGHTS AND ACTS: ICD-10-CM

## 2023-10-02 DIAGNOSIS — E66.01 MORBID OBESITY (H): ICD-10-CM

## 2023-10-02 DIAGNOSIS — E78.5 DYSLIPIDEMIA (HIGH LDL; LOW HDL): ICD-10-CM

## 2023-10-02 DIAGNOSIS — F32.5 DEPRESSION, MAJOR, IN REMISSION (H): ICD-10-CM

## 2023-10-02 PROBLEM — Z79.01 CHRONIC ANTICOAGULATION: Status: ACTIVE | Noted: 2023-10-02

## 2023-10-02 PROBLEM — Z79.01 CHRONIC ANTICOAGULATION: Chronic | Status: ACTIVE | Noted: 2023-10-02

## 2023-10-02 LAB
CHOLEST SERPL-MCNC: 193 MG/DL
HBA1C MFR BLD: 5.4 % (ref 0–5.6)
HDLC SERPL-MCNC: 39 MG/DL
LDLC SERPL CALC-MCNC: 98 MG/DL
NONHDLC SERPL-MCNC: 154 MG/DL
TRIGL SERPL-MCNC: 282 MG/DL

## 2023-10-02 PROCEDURE — 83036 HEMOGLOBIN GLYCOSYLATED A1C: CPT | Performed by: FAMILY MEDICINE

## 2023-10-02 PROCEDURE — 90686 IIV4 VACC NO PRSV 0.5 ML IM: CPT | Performed by: FAMILY MEDICINE

## 2023-10-02 PROCEDURE — 80061 LIPID PANEL: CPT | Performed by: FAMILY MEDICINE

## 2023-10-02 PROCEDURE — 90472 IMMUNIZATION ADMIN EACH ADD: CPT | Performed by: FAMILY MEDICINE

## 2023-10-02 PROCEDURE — 90471 IMMUNIZATION ADMIN: CPT | Performed by: FAMILY MEDICINE

## 2023-10-02 PROCEDURE — 91320 SARSCV2 VAC 30MCG TRS-SUC IM: CPT | Performed by: FAMILY MEDICINE

## 2023-10-02 PROCEDURE — 73140 X-RAY EXAM OF FINGER(S): CPT | Mod: TC | Performed by: RADIOLOGY

## 2023-10-02 PROCEDURE — 90746 HEPB VACCINE 3 DOSE ADULT IM: CPT | Performed by: FAMILY MEDICINE

## 2023-10-02 PROCEDURE — 99396 PREV VISIT EST AGE 40-64: CPT | Mod: 25 | Performed by: FAMILY MEDICINE

## 2023-10-02 PROCEDURE — 99214 OFFICE O/P EST MOD 30 MIN: CPT | Mod: 25 | Performed by: FAMILY MEDICINE

## 2023-10-02 PROCEDURE — 36415 COLL VENOUS BLD VENIPUNCTURE: CPT | Performed by: FAMILY MEDICINE

## 2023-10-02 PROCEDURE — 90480 ADMN SARSCOV2 VAC 1/ONLY CMP: CPT | Performed by: FAMILY MEDICINE

## 2023-10-02 RX ORDER — SERTRALINE HYDROCHLORIDE 100 MG/1
200 TABLET, FILM COATED ORAL DAILY
Qty: 180 TABLET | Refills: 4 | Status: SHIPPED | OUTPATIENT
Start: 2023-10-02

## 2023-10-02 ASSESSMENT — ENCOUNTER SYMPTOMS
PARESTHESIAS: 0
ARTHRALGIAS: 1
DIARRHEA: 0
BREAST MASS: 0
CONSTIPATION: 0
SHORTNESS OF BREATH: 0
HEMATURIA: 0
CHILLS: 0
FEVER: 0
SORE THROAT: 0
NAUSEA: 0
PALPITATIONS: 0
COUGH: 0
FREQUENCY: 0
ABDOMINAL PAIN: 0
DIZZINESS: 0
WEAKNESS: 0
NERVOUS/ANXIOUS: 0
DYSURIA: 0
MYALGIAS: 0
EYE PAIN: 0
JOINT SWELLING: 0
HEMATOCHEZIA: 0
HEADACHES: 0
HEARTBURN: 0

## 2023-10-02 NOTE — PROGRESS NOTES
SUBJECTIVE:   CC: Germania is an 42 year old who presents for preventive health visit.       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    Right thumb numbness  Notices it when Driving, using phone  Always  sore  Tight in forearm  Treatment: Ice, stretching, ibuprofen        2023     4:16 PM 7/10/2023     2:58 PM 2023     1:00 PM   PHQ   PHQ-9 Total Score 1 2 1   Q9: Thoughts of better off dead/self-harm past 2 weeks Not at all Not at all Not at all          Healthy Habits:     Getting at least 3 servings of Calcium per day:  Yes    Bi-annual eye exam:  Yes    Dental care twice a year:  Yes    Sleep apnea or symptoms of sleep apnea:  None    Diet:  Regular (no restrictions)    Frequency of exercise:  2-3 days/week    Duration of exercise:  15-30 minutes    Taking medications regularly:  Yes    Medication side effects:  None    Additional concerns today:  Yes    Social History     Tobacco Use    Smoking status: Former     Years: 8.00     Types: Cigarettes     Quit date:      Years since quittin.7    Smokeless tobacco: Never   Substance Use Topics    Alcohol use: Yes     Comment: Alcoholic Drinks/day: 2-3 drinks per week             10/2/2023     7:19 AM   Alcohol Use   Prescreen: >3 drinks/day or >7 drinks/week? No     Reviewed orders with patient.  Reviewed health maintenance and updated orders accordingly - Yes    Breast Cancer Screening:    FHS-7:       2022     2:25 PM 2022     7:27 PM 10/2/2023     7:20 AM   Breast CA Risk Assessment (FHS-7)   Did any of your first-degree relatives have breast or ovarian cancer? No No No   Did any of your relatives have bilateral breast cancer? No Unknown Unknown   Did any man in your family have breast cancer? No No No   Did any woman in your family have breast and ovarian cancer? No Yes Yes   Did any woman in your family have breast cancer before age 50  y? No Yes Yes   Do you have 2 or more relatives with breast and/or ovarian cancer? No Yes Yes   Do you have 2 or more relatives with breast and/or bowel cancer? No Yes Yes     Mammogram Screening - Offered annual screening and updated Health Maintenance for [place] plan based on risk factor consideration  Pertinent mammograms are reviewed under the imaging tab.    History of abnormal Pap smear: NO - age 30-65 PAP every 5 years with negative HPV co-testing recommended      Latest Ref Rng & Units 12/27/2019     2:17 PM 7/8/2014     9:25 AM   PAP / HPV   PAP Negative for squamous intraepithelial lesion or malignancy. Negative for squamous intraepithelial lesion or malignancy  Electronically signed by Eliot Cohen CT (ASCP) on 1/6/2020 at 10:35 AM    Negative for squamous intraepithelial lesion or malignancy  Electronically signed by Shreya Baez CT (ASCP) on 7/19/2014 at  8:02 AM      HPV 16 DNA NEG Negative     HPV 18 DNA NEG Negative     Other HR HPV NEG Negative       Reviewed and updated as needed this visit by clinical staff   Tobacco  Allergies  Meds  Problems  Med Hx  Surg Hx  Fam Hx          Reviewed and updated as needed this visit by Provider   Tobacco  Allergies  Meds  Problems  Med Hx  Surg Hx  Fam Hx             Review of Systems   Constitutional:  Negative for chills and fever.   HENT:  Positive for ear pain. Negative for congestion, hearing loss and sore throat.    Eyes:  Negative for pain and visual disturbance.   Respiratory:  Negative for cough and shortness of breath.    Cardiovascular:  Negative for chest pain, palpitations and peripheral edema.   Gastrointestinal:  Negative for abdominal pain, constipation, diarrhea, heartburn, hematochezia and nausea.   Breasts:  Negative for tenderness, breast mass and discharge.   Genitourinary:  Negative for dysuria, frequency, genital sores, hematuria, pelvic pain, urgency, vaginal bleeding and vaginal discharge.   Musculoskeletal:  " Positive for arthralgias. Negative for joint swelling and myalgias.   Skin:  Negative for rash.   Neurological:  Negative for dizziness, weakness, headaches and paresthesias.   Psychiatric/Behavioral:  Negative for mood changes. The patient is not nervous/anxious.         OBJECTIVE:   /76 (BP Location: Left arm, Patient Position: Sitting, Cuff Size: Adult Large)   Pulse 72   Temp 97.5  F (36.4  C) (Temporal)   Resp 21   Ht 1.769 m (5' 9.65\")   Wt 146.1 kg (322 lb)   LMP 09/21/2023 (Approximate)   SpO2 98%   BMI 46.67 kg/m    Physical Exam  GENERAL: healthy, alert and no distress  EYES: Eyes grossly normal to inspection, PERRL and conjunctivae and sclerae normal  HENT: ear canals and TM's normal with bilateral serous effusion.  NECK: no adenopathy, no asymmetry, masses, or scars and thyroid normal to palpation  RESP: lungs clear to auscultation - no rales, rhonchi or wheezes  BREAST: normal without masses, tenderness or nipple discharge and no palpable axillary masses or adenopathy  CV: regular rate and rhythm, normal S1 S2, no S3 or S4, no murmur, click or rub, no peripheral edema and peripheral pulses strong  ABDOMEN: soft, nontender, no hepatosplenomegaly, no masses and bowel sounds normal  MS: no gross musculoskeletal defects noted, no edema  SKIN: no suspicious lesions or rashes  NEURO: Normal strength and tone, mentation intact and speech normal  PSYCH: mentation appears normal, affect normal/bright    ASSESSMENT/PLAN:   Germania was seen today for physical and hand pain.    Diagnoses and all orders for this visit:    Routine general medical examination at a health care facility    Multiple subsegmental pulmonary emboli without acute cor pulmonale (H)  Triggered: OCP. Completed anticoagulation. Hyperecoag eval negative. Condoms for contraception.    Morbid obesity (H)  BMI 47. Unable to lose weight despite efforts at diet and exercise. Co-morbidities: depression, hyperlipidemia, PCOS. Discussed " options: self-guided nutrition/exercise, bariatric care guided nutrition/exercise, medication, and surgery. She wishes to use semaglutide. Aware of shortages which may limit availability.   -     Hemoglobin A1c; Future  -     Hemoglobin A1c  -     Semaglutide-Weight Management (WEGOVY) 0.25 MG/0.5ML pen; Inject 0.25 mg Subcutaneous once a week  -     Med Therapy Management Referral    Visit for screening mammogram  2nd degree relative with breast cancer. Prefers annual screening.  -     *MA Screening Digital Bilateral; Future    Dyslipidemia (high LDL; low HDL)  -     Lipid panel reflex to direct LDL Fasting; Future  -     Lipid panel reflex to direct LDL Fasting  -     Semaglutide-Weight Management (WEGOVY) 0.25 MG/0.5ML pen; Inject 0.25 mg Subcutaneous once a week    Mixed obsessional thoughts and acts    Major depressive disorder, recurrent episode, mild (H24)  Symptoms have been well-controlled without significant medication side effects.   -     sertraline (ZOLOFT) 100 MG tablet; Take 2 tablets (200 mg) by mouth daily    Generalized anxiety disorder  -     sertraline (ZOLOFT) 100 MG tablet; Take 2 tablets (200 mg) by mouth daily    Primary osteoarthritis of right hand  Right thumb MCP osteoarthritis. Check xray. Hand therapy.  -     Occupational Therapy Referral; Future  -     XR Finger Right G/E 2 Views; Future    Numbness of right hand  In distribution of median nerve. Working diagnosis: carpal tunnel syndrome. Wear wrist splint night and day for 4 weeks. If symptoms are not improving, she will let me know for referral to hand surgeon. Hand therapy.   -     Occupational Therapy Referral; Future  -     Wrist/Arm Supplies Order Wrist Brace; Right; non-thumb spica    Polycystic ovarian syndrome    Other orders  -     HEPATITIS B, ADULT 20+ (ENGERIX-B/RECOMBIVAX HB)  -     REVIEW OF HEALTH MAINTENANCE PROTOCOL ORDERS  -     INFLUENZA VACCINE IM > 6 MONTHS VALENT IIV4 (AFLURIA/FLUZONE)  -     COVID-19 12+  "(2023-24) (PFIZER)          COUNSELING:  Reviewed preventive health counseling, as reflected in patient instructions      BMI:   Estimated body mass index is 46.67 kg/m  as calculated from the following:    Height as of this encounter: 1.769 m (5' 9.65\").    Weight as of this encounter: 146.1 kg (322 lb).   Weight management plan: Discussed healthy diet and exercise guidelines        She reports that she quit smoking about 18 years ago. Her smoking use included cigarettes. She has never used smokeless tobacco.          Brooke Pal MD  [facility]  Prior to immunization administration, verified patients identity using patient s name and date of birth. Please see Immunization Activity for additional information.     Screening Questionnaire for Adult Immunization    Are you sick today?   No   Do you have allergies to medications, food, a vaccine component or latex?   No   Have you ever had a serious reaction after receiving a vaccination?   No   Do you have a long-term health problem with heart, lung, kidney, or metabolic disease (e.g., diabetes), asthma, a blood disorder, no spleen, complement component deficiency, a cochlear implant, or a spinal fluid leak?  Are you on long-term aspirin therapy?   No   Do you have cancer, leukemia, HIV/AIDS, or any other immune system problem?   No   Do you have a parent, brother, or sister with an immune system problem?   No   In the past 3 months, have you taken medications that affect  your immune system, such as prednisone, other steroids, or anticancer drugs; drugs for the treatment of rheumatoid arthritis, Crohn s disease, or psoriasis; or have you had radiation treatments?   No   Have you had a seizure, or a brain or other nervous system problem?   No   During the past year, have you received a transfusion of blood or blood    products, or been given immune (gamma) globulin or antiviral drug?   No   For women: Are you pregnant or is there a chance " you could become       pregnant during the next month?   No   Have you received any vaccinations in the past 4 weeks?   No     Immunization questionnaire answers were all negative.      Patient instructed to remain in clinic for 15 minutes afterwards, and to report any adverse reactions.     Screening performed by Fer Calabrese MA on 10/2/2023 at 7:37 AM.

## 2023-10-06 ENCOUNTER — VIRTUAL VISIT (OUTPATIENT)
Dept: PHARMACY | Facility: CLINIC | Age: 43
End: 2023-10-06
Attending: FAMILY MEDICINE
Payer: COMMERCIAL

## 2023-10-06 ENCOUNTER — TELEPHONE (OUTPATIENT)
Dept: FAMILY MEDICINE | Facility: CLINIC | Age: 43
End: 2023-10-06
Payer: COMMERCIAL

## 2023-10-06 DIAGNOSIS — E66.01 MORBID OBESITY (H): Primary | ICD-10-CM

## 2023-10-06 DIAGNOSIS — J30.2 SEASONAL ALLERGIC RHINITIS, UNSPECIFIED TRIGGER: ICD-10-CM

## 2023-10-06 DIAGNOSIS — F41.1 GENERALIZED ANXIETY DISORDER: ICD-10-CM

## 2023-10-06 PROCEDURE — 99607 MTMS BY PHARM ADDL 15 MIN: CPT | Mod: VID

## 2023-10-06 PROCEDURE — 99605 MTMS BY PHARM NP 15 MIN: CPT | Mod: VID

## 2023-10-06 RX ORDER — FLUTICASONE PROPIONATE 50 MCG
1 SPRAY, SUSPENSION (ML) NASAL DAILY
COMMUNITY

## 2023-10-06 NOTE — PROGRESS NOTES
Medication Therapy Management (MTM) Encounter    ASSESSMENT:                            Medication Adherence/Access: No issues identified    Weight management: Given patient's elevated BMI, patient would be indicated for medication therapy to help with weight management. Educated that Wegovy would be the most effective thus agree for patient to start using once and if approved by insurance. Educated today on side effects, administration, storage, and dosing. Will plan to start prior authorization approval. Due to national shortage of Wegovy, recommend patient try to obtain both 0..25mg and 0.5mg dose before starting to limit interruption in therapy as these are the hardest doses to obtain at this time.     Allergies: Educated on maximum dose of Flonase to consider using if needed for symptoms.      Depression: Stable.     PLAN:                            Maximum dose of Flonase is 2 sprays into each nostril daily if needed for your symptoms.     I will start prior authorization process for Wegovy.     Follow-up: 1-2 weeks to check in on status of Wegovy approval    SUBJECTIVE/OBJECTIVE:                          Germania Cheung is a 42 year old female contacted via secure video for an initial visit. She was referred to me from Dr. Pal.      Reason for visit: Weight management.    Allergies/ADRs: Reviewed in chart  Past Medical History: Reviewed in chart  Tobacco: She reports that she quit smoking about 18 years ago. Her smoking use included cigarettes. She has never used smokeless tobacco.  Alcohol: 4-6 beverages / week    Medication Adherence/Access: Patient takes medications directly from bottles.  Patient takes medications 1 time(s) per day.   Per patient, misses medication 0 times per week.   Medication barriers: none.     Weight management:   Wegovy 0.25mg weekly- recently prescribed but has not started taking yet.   Patient notes that the pharmacy let her know that the Wegovy requires a prior authorization, per  Chart review this process has not been started.     Allergies:   Flonase 1 spray into each nostril daily- notes just starting this medication to help with symptoms thus is not sure how effective it has been yet.     Depression:   Sertraline 200mg daily- notes that this is effective.     Today's Vitals: LMP 09/21/2023 (Approximate)   ----------------  I spent 30 minutes with this patient today. All changes were made via collaborative practice agreement with Brooke Pal MD. A copy of the visit note was provided to the patient's provider(s).    A summary of these recommendations was sent via [facility].    Jacqueline Joseph, PharmD  Medication Therapy Management Pharmacist   [facility] and [facility]     Telemedicine Visit Details  Type of service:  Video Conference via frents  Start Time:  2:24 PM  End Time:  2:54 PM       Medication Therapy Recommendations  No medication therapy recommendations to display

## 2023-10-06 NOTE — TELEPHONE ENCOUNTER
CARMITA PA REQUEST    Please route outcome to MTJASON/RP: Abril Joseph    Prior Authorization Retail Medication Request    Medication/Dose: Semaglutide-Weight Management (WEGOVY) 0.25 MG/0.5ML pen & Semaglutide-Weight Management (WEGOVY) 0.5 MG/0.5ML pen (for second month)  Inject 0.25 mg Subcutaneous once a week for 4 weeks then increase to 0.5mg once weekly there after.   ICD code (if different than what is on RX):  Morbid obesity (H) [E66.01]   Previously Tried and Failed:  None  Rationale:  Patient has a BMI of 46.67 and hyperlipidemia thus would benefit from weight loss medication.     Insurance Name:  [employer]   Insurance ID:  [id number]      Pharmacy Information (if different than what is on RX)  [facility]  Phone:  [phone]

## 2023-10-09 NOTE — PATIENT INSTRUCTIONS
"Recommendations from today's MTM visit:                                                      Maximum dose of Flonase is 2 sprays into each nostril daily if needed for your symptoms.     I will start prior authorization process for Wegovy.     Follow-up: 1-2 weeks to check in on status of Wegovy approval    It was great speaking with you today.  I value your experience and would be very thankful for your time in providing feedback in our clinic survey. In the next few days, you may receive an email or text message from [email or web address] with a link to a survey related to your  clinical pharmacist.\"     To schedule another MTM appointment, please call the clinic directly or you may call the MTM scheduling line at [phone] or toll-free at [phone].     My Clinical Pharmacist's contact information:                                                      Please feel free to contact me with any questions or concerns you have.      Jacqueline Joseph, PharmD  Medication Therapy Management Pharmacist   [facility] and [facility]    "

## 2023-10-11 NOTE — TELEPHONE ENCOUNTER
PA Initiation    Medication: WEGOVY 0.25 MG/0.5ML SC SOAJ  Insurance Company: [employer] - Phone [phone] Fax [phone]  Ph[facility] Filling the [id number] [place], [place] - [address] [place] [address]  Filling Pharmacy Phone: [phone]  Filling Pharmacy Fax:    Start Date: 10/11/2023

## 2023-10-12 ENCOUNTER — MYC REFILL (OUTPATIENT)
Dept: FAMILY MEDICINE | Facility: CLINIC | Age: 43
End: 2023-10-12
Payer: COMMERCIAL

## 2023-10-12 DIAGNOSIS — E66.01 MORBID OBESITY (H): Primary | ICD-10-CM

## 2023-10-12 DIAGNOSIS — E78.5 DYSLIPIDEMIA (HIGH LDL; LOW HDL): ICD-10-CM

## 2023-10-12 NOTE — TELEPHONE ENCOUNTER
Prior Authorization Approval    Medication: WEGOVY 0.25 MG/0.5ML SC SOAJ  Authorization Effective Date: 9/12/2023  Authorization Expiration Date: 10/11/2024  Approved Dose/Quantity: 2/28  Reference #: [id number]   Insurance Company: [employer] - Phone [phone] Fax [phone]  Expected CoPay: $    CoPay Card Available:      Financial Assistance Needed:   Which Pharm[facility] is filling the prescription[id number] [place], [place] - [address]  Pharmacy Notified: Yes  Patient Notified: Yes

## 2023-10-13 ENCOUNTER — THERAPY VISIT (OUTPATIENT)
Dept: OCCUPATIONAL THERAPY | Facility: CLINIC | Age: 43
End: 2023-10-13
Attending: FAMILY MEDICINE
Payer: COMMERCIAL

## 2023-10-13 DIAGNOSIS — M19.041 PRIMARY OSTEOARTHRITIS OF RIGHT HAND: ICD-10-CM

## 2023-10-13 DIAGNOSIS — M79.641 PAIN OF RIGHT HAND: Primary | ICD-10-CM

## 2023-10-13 DIAGNOSIS — M25.531 RIGHT WRIST PAIN: ICD-10-CM

## 2023-10-13 DIAGNOSIS — R20.0 NUMBNESS OF RIGHT HAND: ICD-10-CM

## 2023-10-13 PROCEDURE — 97165 OT EVAL LOW COMPLEX 30 MIN: CPT | Mod: GO | Performed by: OCCUPATIONAL THERAPIST

## 2023-10-13 PROCEDURE — 97530 THERAPEUTIC ACTIVITIES: CPT | Mod: GO | Performed by: OCCUPATIONAL THERAPIST

## 2023-10-13 PROCEDURE — 97760 ORTHOTIC MGMT&TRAING 1ST ENC: CPT | Mod: GO | Performed by: OCCUPATIONAL THERAPIST

## 2023-10-13 PROCEDURE — 97110 THERAPEUTIC EXERCISES: CPT | Mod: GO | Performed by: OCCUPATIONAL THERAPIST

## 2023-10-13 NOTE — PROGRESS NOTES
OCCUPATIONAL THERAPY EVALUATION  Type of Visit: Evaluation    See electronic medical record for Abuse and Falls Screening details.    Subjective      Presenting condition or subjective complaint: Arthirtis in right thumb joint and what is most likely carpal tunnel  Date of onset: 10/02/23 (Referral)    Relevant medical history: Arthritis; Depression; DVT (blood clot)   Dates & types of surgery: Gall bladder -2000.     Childbirth - 2012    Prior diagnostic imaging/testing results: X-ray     Prior therapy history for the same diagnosis, illness or injury: No      Patient complains of right-sided basilar thumb pain, ongoing and worsening since the beginning of the Covid pandemic. Also endorses numbness, pain and tingling in median nerve distribution, worsening over the last nine months. Has been wearing a thumb spica issued by PCP, some success with NSAIDs for pain-relief.     Prior Level of Function  Transfers: Independent  Ambulation: Independent  ADL: Independent  IADL: Driving, Finances, Housekeeping, Laundry, Meal preparation, Medication management, Work, Yard work    Living Environment  Social support: With a significant other or spouse   Type of home: House   Stairs to enter the home: Yes 4 Is there a railing: No   Ramp: No   Stairs inside the home: Yes 12 Is there a railing: Yes   Help at home: None  Equipment owned:       Employment: Yes Teacher  Hobbies/Interests: Reading, knitting, meeting up with friends    Patient goals for therapy: Drive, type, write , hold anything without my hand falling asleep or my thumb being incredibly sore.    Imaging Impression (X-ray right hand): IMPRESSION: Normal joint spaces and alignment. No fracture.        Objective   ADDITIONAL HISTORY:  Right hand dominant  Patient reports symptoms of pain, stiffness/loss of motion, weakness/loss of strength, edema, numbness, and tingling   Transportation: drives  Currently working in normal job without restrictions    Functional Outcome  Measure:   Upper Extremity Functional Index Score:  SCORE:   Column Totals: /80: 69   (A lower score indicates greater disability.)    PAIN:  Pain Level at Rest: 6/10  Pain Level with Use: 7/10  Pain Location: Basilar thumb, including the MCP joint. Radiating numbness and tingling grossly in median nerve distribution.   Pain Quality: Aching, sore.   Pain Frequency: constant  Pain is Worst: daytime or nighttime  Pain is Exacerbated By: Typing, gripping, hair care   Pain is Relieved By: NSAIDs and rest/orthotic wear   Pain Progression: Worsened    POSTURE: Normal     EDEMA:   Finger/Thumb   (Circumference measured in cm) 10/13/2023     Wrist/Elbow  (Circumference measured in cm) 10/13/2023   Distal Wrist Crease 18.6 cm to the RUE, 16.2 cm to LUE   Elbow Crease    1st Dorsal Compartment    Radial Styloid       SENSATION: Decreased Median Nerve distribution per pt report     ROM:   Wrist ROM  Left AROM Right AROM    Extension 67 61   Flexion 79 68         Thumb ROM  Left AROM Right AROM    MP Joint HE/50 HE/48   IP Joint  HE/52 HE/44   Radial Abduction 47 46+   Palmar Abduction 36 41,+   Kapandji Opposition Scale (0-10/10) 9.5/10 10/10      OBSERVATIONS/APPEARANCE:   Thumb Left Right   Shoulder deformity present at CMC joint - (not present) - (not present)   Edema over CMC joint - (not present) - (not present)   Noted collapse of MP Joint into hyperextension during pinch - (not present) - (not present)      SPECIAL TESTS:   CMC OA Provocative Tests  Pain Report Right   Crepitus Present - (not present)   Thumb Adduction Stress Test + (mild)   Finkelstein's Test + (mild)   WHAT Test + (mild)     CTS Special Tests  Pain Report Left Right   Median Nerve Compression at Pronator - (not present) + (mild)   Carpal Compression Test-Durkan Test (30 sec) - (not present) Positive at 8-10 seconds    Patel Test for Lumbrical Incursion (fist x30 sec) - (not present) Positive at 0-5 seconds    Tinel's at Carpal Tunnel - (not present)  + (mild)   Phalen's Sign - (not present) Positive at 15-20 seconds     - Mild discomfort with gentle loading to right thumb MCP UCL and RCL    STRENGTH: Deferred due to pain    PALPATION:  Non-tender to palpation about the right thumb MCP, CMC.          Assessment & Plan   CLINICAL IMPRESSIONS  Medical Diagnosis: Primary OA right hand, numbness right hand    Treatment Diagnosis: Primary OA right hand, numbness right hand, right hand and wrist pain    Impression/Assessment: Pt is a 42 year old female presenting to Occupational Therapy due to right hand/wrist pain.  The following significant findings have been identified: Impaired activity tolerance, Impaired coordination, Impaired ROM, Impaired sensation, Impaired strength, and Pain.  These identified deficits interfere with their ability to perform self care tasks, recreational activities, household chores, driving , household mobility, community mobility, school tasks, medication management, financial management,  yard work, care of others, and meal planning and preparation as compared to previous level of function.   Patient's limitations or Problem List includes: Pain, Decreased ROM/motion, Increased edema, Weakness, Sensory disturbance, Decreased stability, Hypermobility, Hypomobility, Decreased , Decreased pinch, Decreased coordination, Decreased dexterity, and Tightness in musculature of the right wrist, hand, thumb, index finger, long finger, ring finger, and small finger which interferes with the patient's ability to perform Self Care Tasks (dressing), Work Tasks, Sleep Patterns, Recreational Activities, Household Chores, and Driving  as compared to previous level of function.    Clinical Decision Making (Complexity):  Assessment of Occupational Performance: 3-5 Performance Deficits  Occupational Performance Limitations: toileting, dressing, child rearing, communication management, driving and community mobility, health management and maintenance, home  establishment and management, meal preparation and cleanup, shopping, sleep, school, work, and leisure activities  Clinical Decision Making (Complexity): Low complexity    PLAN OF CARE  Treatment Interventions:  Modalities:  Paraffin  Therapeutic Exercise:  AROM, AAROM, PROM, Tendon Gliding, Blocking, Reverse Blocking, Place and Hold, Contract Relax, Extensor Tracking, Isotonics, Isometrics, and Stabilization  Neuromuscular re-education:  Nerve Gliding, Coordination/Dexterity, Sensory re-education, Kinesthetic Training, Proprioceptive Training, Posture, Kinesiotaping, Strain Counter Strain, Isometrics, and Stabilization  Manual Techniques:  Coordination/Dexterity, Joint mobilization, Friction massage, Myofascial release, and Manual edema mobilization  Orthotic Fabrication:  Static, Finger based, Hand based, and Forearm based  Self Care:  Self Care Tasks, Ergonomic Considerations, and Work Tasks    Long Term Goals   OT Goal 1  Goal Identifier: Goal I  Goal Description: Patient will open a tight or new jar with minimal pain and/or difficulty (0-2/10) using adaptive equipment as needed.  Rationale: In order to maximize safety and independence with performance of self-care activities  Goal Progress: New  Target Date: 12/08/23      Frequency of Treatment: 1x/week  Duration of Treatment: 8 weeks     Education Assessment: Learner/Method: Patient;No Barriers to Learning;Pictures/Video;Demonstration;Reading;Listening     Risks and benefits of evaluation/treatment have been explained.   Patient/Family/caregiver agrees with Plan of Care.     Evaluation Time:    OT Eval, Low Complexity Minutes (83542): 18    Signing Clinician: Roger Epperson OT

## 2023-10-14 PROBLEM — R20.0 NUMBNESS OF RIGHT HAND: Status: ACTIVE | Noted: 2023-10-14

## 2023-10-14 PROBLEM — M79.641 PAIN OF RIGHT HAND: Status: ACTIVE | Noted: 2023-10-14

## 2023-10-14 PROBLEM — M25.531 RIGHT WRIST PAIN: Status: ACTIVE | Noted: 2023-10-14

## 2023-10-19 ENCOUNTER — HOSPITAL ENCOUNTER (OUTPATIENT)
Dept: MAMMOGRAPHY | Facility: CLINIC | Age: 43
Discharge: HOME | End: 2023-10-19
Attending: FAMILY MEDICINE | Admitting: FAMILY MEDICINE
Payer: COMMERCIAL

## 2023-10-19 DIAGNOSIS — Z12.31 VISIT FOR SCREENING MAMMOGRAM: ICD-10-CM

## 2023-10-19 PROCEDURE — 77067 SCR MAMMO BI INCL CAD: CPT

## 2023-10-20 ENCOUNTER — THERAPY VISIT (OUTPATIENT)
Dept: OCCUPATIONAL THERAPY | Facility: CLINIC | Age: 43
End: 2023-10-20
Attending: FAMILY MEDICINE
Payer: COMMERCIAL

## 2023-10-20 DIAGNOSIS — M19.041 PRIMARY OSTEOARTHRITIS OF RIGHT HAND: Primary | ICD-10-CM

## 2023-10-20 DIAGNOSIS — R20.0 NUMBNESS OF RIGHT HAND: ICD-10-CM

## 2023-10-20 DIAGNOSIS — M79.641 PAIN OF RIGHT HAND: ICD-10-CM

## 2023-10-20 DIAGNOSIS — M25.531 RIGHT WRIST PAIN: ICD-10-CM

## 2023-10-20 PROCEDURE — 97140 MANUAL THERAPY 1/> REGIONS: CPT | Mod: GO | Performed by: OCCUPATIONAL THERAPIST

## 2023-10-20 PROCEDURE — 97112 NEUROMUSCULAR REEDUCATION: CPT | Mod: GO | Performed by: OCCUPATIONAL THERAPIST

## 2023-10-24 ENCOUNTER — HOSPITAL ENCOUNTER (OUTPATIENT)
Dept: MAMMOGRAPHY | Facility: CLINIC | Age: 43
Discharge: HOME | End: 2023-10-24
Attending: FAMILY MEDICINE | Admitting: FAMILY MEDICINE
Payer: COMMERCIAL

## 2023-10-24 DIAGNOSIS — N63.20 MASS OF LEFT BREAST, UNSPECIFIED QUADRANT: ICD-10-CM

## 2023-10-24 PROCEDURE — 76642 ULTRASOUND BREAST LIMITED: CPT | Mod: LT

## 2023-10-27 ENCOUNTER — THERAPY VISIT (OUTPATIENT)
Dept: OCCUPATIONAL THERAPY | Facility: CLINIC | Age: 43
End: 2023-10-27
Attending: FAMILY MEDICINE
Payer: COMMERCIAL

## 2023-10-27 DIAGNOSIS — R20.0 NUMBNESS OF RIGHT HAND: ICD-10-CM

## 2023-10-27 DIAGNOSIS — M79.641 PAIN OF RIGHT HAND: ICD-10-CM

## 2023-10-27 DIAGNOSIS — M19.041 PRIMARY OSTEOARTHRITIS OF RIGHT HAND: Primary | ICD-10-CM

## 2023-10-27 DIAGNOSIS — M25.531 RIGHT WRIST PAIN: ICD-10-CM

## 2023-10-27 PROCEDURE — 97530 THERAPEUTIC ACTIVITIES: CPT | Mod: GO | Performed by: OCCUPATIONAL THERAPIST

## 2023-10-27 PROCEDURE — 97140 MANUAL THERAPY 1/> REGIONS: CPT | Mod: GO | Performed by: OCCUPATIONAL THERAPIST

## 2023-10-27 PROCEDURE — 97112 NEUROMUSCULAR REEDUCATION: CPT | Mod: GO | Performed by: OCCUPATIONAL THERAPIST

## 2023-11-06 ENCOUNTER — THERAPY VISIT (OUTPATIENT)
Dept: OCCUPATIONAL THERAPY | Facility: REHABILITATION | Age: 43
End: 2023-11-06
Payer: COMMERCIAL

## 2023-11-06 DIAGNOSIS — M25.531 RIGHT WRIST PAIN: ICD-10-CM

## 2023-11-06 DIAGNOSIS — M79.641 PAIN OF RIGHT HAND: ICD-10-CM

## 2023-11-06 DIAGNOSIS — R20.0 NUMBNESS OF RIGHT HAND: ICD-10-CM

## 2023-11-06 DIAGNOSIS — M19.041 PRIMARY OSTEOARTHRITIS OF RIGHT HAND: Primary | ICD-10-CM

## 2023-11-06 PROCEDURE — 97763 ORTHC/PROSTC MGMT SBSQ ENC: CPT | Mod: GO | Performed by: OCCUPATIONAL THERAPIST

## 2023-11-06 PROCEDURE — 97530 THERAPEUTIC ACTIVITIES: CPT | Mod: GO | Performed by: OCCUPATIONAL THERAPIST

## 2023-11-29 PROBLEM — M79.641 PAIN OF RIGHT HAND: Status: RESOLVED | Noted: 2023-10-14 | Resolved: 2023-11-29

## 2023-11-29 PROBLEM — R20.0 NUMBNESS OF RIGHT HAND: Status: RESOLVED | Noted: 2023-10-14 | Resolved: 2023-11-29

## 2023-11-29 PROBLEM — M25.531 RIGHT WRIST PAIN: Status: RESOLVED | Noted: 2023-10-14 | Resolved: 2023-11-29

## 2023-11-29 PROBLEM — M19.041 PRIMARY OSTEOARTHRITIS OF RIGHT HAND: Status: RESOLVED | Noted: 2023-10-02 | Resolved: 2023-11-29

## 2023-12-06 ENCOUNTER — MYC MEDICAL ADVICE (OUTPATIENT)
Dept: FAMILY MEDICINE | Facility: CLINIC | Age: 43
End: 2023-12-06
Payer: COMMERCIAL

## 2023-12-06 DIAGNOSIS — G56.03 BILATERAL CARPAL TUNNEL SYNDROME: Primary | ICD-10-CM

## 2023-12-11 ENCOUNTER — TRANSFERRED RECORDS (OUTPATIENT)
Dept: HEALTH INFORMATION MANAGEMENT | Facility: CLINIC | Age: 43
End: 2023-12-11
Payer: COMMERCIAL

## 2024-02-12 ENCOUNTER — TRANSFERRED RECORDS (OUTPATIENT)
Dept: HEALTH INFORMATION MANAGEMENT | Facility: CLINIC | Age: 44
End: 2024-02-12
Payer: COMMERCIAL

## 2024-02-23 ENCOUNTER — TRANSFERRED RECORDS (OUTPATIENT)
Dept: HEALTH INFORMATION MANAGEMENT | Facility: CLINIC | Age: 44
End: 2024-02-23
Payer: COMMERCIAL

## 2024-02-29 PROBLEM — G56.03 BILATERAL CARPAL TUNNEL SYNDROME: Status: ACTIVE | Noted: 2024-02-29

## 2024-03-01 ENCOUNTER — MYC MEDICAL ADVICE (OUTPATIENT)
Dept: FAMILY MEDICINE | Facility: CLINIC | Age: 44
End: 2024-03-01
Payer: COMMERCIAL

## 2024-03-01 DIAGNOSIS — E66.01 MORBID OBESITY (H): Primary | ICD-10-CM

## 2024-03-04 NOTE — TELEPHONE ENCOUNTER
Message routed to Dr Pal for review    Lindsay Pride RN  [facility]    [facility]ia [facility]ung [facility]/Ob Support Pool  Phone Number: [phone]     Hejessica Pal -  I was chatting with the pharmacist about the lack of Wegovy ( still waiting on the prescription I got in October!), and she mentioned they were switching a lot of customers to Zepbound.  What are your thoughts on this and do you feel as though this is something that I should do?  If so, I know the pharmacy at [facility] in [place] is stocking it.    Thanks!  Germania Cheung

## 2024-03-11 NOTE — TELEPHONE ENCOUNTER
Germania was seen today for medication question.    Diagnoses and all orders for this visit:    Morbid obesity (H)  -     tirzepatide-Weight Management (ZEPBOUND) 2.5 MG/0.5ML prefilled pen; Inject 0.5 mLs (2.5 mg) Subcutaneous every 7 days for 28 days  -     tirzepatide-Weight Management (ZEPBOUND) 5 MG/0.5ML prefilled pen; Inject 0.5 mLs (5 mg) Subcutaneous every 7 days

## 2024-05-15 ENCOUNTER — TELEPHONE (OUTPATIENT)
Dept: FAMILY MEDICINE | Facility: CLINIC | Age: 44
End: 2024-05-15
Payer: COMMERCIAL

## 2024-05-15 NOTE — TELEPHONE ENCOUNTER
CARMITA PA REQUEST    Please route outcome to CARMITA/RP: Abril Joseph    Prior Authorization Retail Medication Request    Medication/Dose: tirzepatide-Weight Management (ZEPBOUND) 5 MG/0.5ML prefilled pen inject 0.5mLs (5mg) weekly   ICD code (if different than what is on RX):  Morbid obesity (H) [E66.01]  - Primary    Pharmacy Information (if different than what is on RX)  Name:  [id number] - [place], [place] - [address]  Phone:  [phone]

## 2024-05-30 ENCOUNTER — ALLIED HEALTH/NURSE VISIT (OUTPATIENT)
Dept: FAMILY MEDICINE | Facility: CLINIC | Age: 44
End: 2024-05-30
Payer: COMMERCIAL

## 2024-05-30 VITALS — BODY MASS INDEX: 43.4 KG/M2 | HEIGHT: 69 IN | WEIGHT: 293 LBS

## 2024-05-30 DIAGNOSIS — E66.01 MORBID OBESITY (H): Primary | ICD-10-CM

## 2024-05-30 PROCEDURE — 99207 PR NO CHARGE NURSE ONLY: CPT

## 2024-05-30 NOTE — TELEPHONE ENCOUNTER
PA Initiation    Medication: ZEPBOUND 5 MG/0.5ML SC SOAJ  Insurance Company: [employer] - Phone [phone] Fax [phone]  Pharmacy Filling the Rx: [id number] - [place], [place] - [address]  Filling Pharmacy Phone: [phone]  Filling Pharmacy Fax:    Start Date: 5/29/2024

## 2024-05-31 NOTE — TELEPHONE ENCOUNTER
PRIOR AUTHORIZATION DENIED    Medication: ZEPBOUND 5 MG/0.5ML SC SOAJ  Insurance Company: [employer] - Phone [phone] Fax [phone]  Denial Date: 5/31/2024  Denial Reason(s): Weight Loss Drugs are Excluded      Appeal Information: N/A  Patient Notified: No

## 2024-06-03 ENCOUNTER — MYC MEDICAL ADVICE (OUTPATIENT)
Dept: FAMILY MEDICINE | Facility: CLINIC | Age: 44
End: 2024-06-03
Payer: COMMERCIAL

## 2024-06-03 DIAGNOSIS — E78.5 DYSLIPIDEMIA (HIGH LDL; LOW HDL): ICD-10-CM

## 2024-06-03 DIAGNOSIS — F32.5 DEPRESSION, MAJOR, IN REMISSION (H): ICD-10-CM

## 2024-06-03 DIAGNOSIS — E28.2 POLYCYSTIC OVARIAN SYNDROME: ICD-10-CM

## 2024-06-03 DIAGNOSIS — E66.01 MORBID OBESITY (H): Primary | ICD-10-CM

## 2024-06-04 NOTE — TELEPHONE ENCOUNTER
Diagnoses and all orders for this visit:    Morbid obesity (H)  Dyslipidemia (high LDL; low HDL)  Polycystic ovarian syndrome  Depression, major, in remission (H24)  -     Semaglutide-Weight Management (WEGOVY) 0.25 MG/0.5ML pen; Inject 0.25 mg Subcutaneous once a week for 28 days  -     Semaglutide-Weight Management (WEGOVY) 0.5 MG/0.5ML pen; Inject 0.5 mg Subcutaneous once a week

## 2024-06-07 NOTE — TELEPHONE ENCOUNTER
Writer responded via [email or web address].      Giuseppe Obregon, BSN RN  [facility]

## 2024-06-12 ENCOUNTER — MYC MEDICAL ADVICE (OUTPATIENT)
Dept: PHARMACY | Facility: CLINIC | Age: 44
End: 2024-06-12
Payer: COMMERCIAL

## 2024-06-12 ENCOUNTER — MYC MEDICAL ADVICE (OUTPATIENT)
Dept: FAMILY MEDICINE | Facility: CLINIC | Age: 44
End: 2024-06-12
Payer: COMMERCIAL

## 2024-06-12 DIAGNOSIS — E28.2 POLYCYSTIC OVARIAN SYNDROME: ICD-10-CM

## 2024-06-12 DIAGNOSIS — E78.5 DYSLIPIDEMIA (HIGH LDL; LOW HDL): ICD-10-CM

## 2024-06-12 DIAGNOSIS — I82.451 ACUTE DEEP VEIN THROMBOSIS (DVT) OF RIGHT PERONEAL VEIN (H): ICD-10-CM

## 2024-06-12 DIAGNOSIS — E66.01 MORBID OBESITY (H): Primary | ICD-10-CM

## 2024-06-14 NOTE — TELEPHONE ENCOUNTER
I'm waiting to see if Germania wants to try semaglutide or Qsymia. See separate [employer] message on that question.

## 2024-06-14 NOTE — TELEPHONE ENCOUNTER
Hi Dr. Pal,     Since patient's insurance no longer covers weight loss medications, are you okay with starting compounded semaglutide from [facility]?     Additionally, I have transitioned out of my role in primary care and can no longer see this patient unfortunately, would you be able to place a new MTM referral so patient can continue to follow with a new pharmacist for check-ins while on the semaglutide?     Jacqueline Joseph, PharmD  Medication Therapy Management Pharmacist   [facility]

## 2024-06-18 RX ORDER — PHENTERMINE AND TOPIRAMATE 3.75; 23 MG/1; MG/1
1 CAPSULE, EXTENDED RELEASE ORAL
Qty: 14 CAPSULE | Refills: 0 | Status: SHIPPED | OUTPATIENT
Start: 2024-06-18 | End: 2024-07-02

## 2024-06-18 RX ORDER — PHENTERMINE AND TOPIRAMATE 7.5; 46 MG/1; MG/1
1 CAPSULE, EXTENDED RELEASE ORAL DAILY
Qty: 84 CAPSULE | Refills: 0 | Status: SHIPPED | OUTPATIENT
Start: 2024-06-18 | End: 2024-07-31

## 2024-06-19 NOTE — TELEPHONE ENCOUNTER
Writer replied to patient via [employer].  NELLY HorvathN, RN (she/her)  [facility] RN

## 2024-06-24 ENCOUNTER — TRANSFERRED RECORDS (OUTPATIENT)
Dept: HEALTH INFORMATION MANAGEMENT | Facility: CLINIC | Age: 44
End: 2024-06-24
Payer: COMMERCIAL

## 2024-06-25 ENCOUNTER — TELEPHONE (OUTPATIENT)
Dept: FAMILY MEDICINE | Facility: CLINIC | Age: 44
End: 2024-06-25

## 2024-06-25 DIAGNOSIS — E66.01 MORBID OBESITY (H): Primary | ICD-10-CM

## 2024-06-28 NOTE — TELEPHONE ENCOUNTER
Central Prior Authorization Team   Phone: [phone]    PA Initiation    Medication: Qsymia  Insurance Company: [employer] - Phone [phone] Fax [phone]  Pharmacy Filling the Rx: [id number] - [place], [place] - [address]   Filling Pharmacy Phone: [phone]  Filling Pharmacy Fax:    Start Date: 6/28/2024

## 2024-07-01 NOTE — TELEPHONE ENCOUNTER
PRIOR AUTHORIZATION DENIED    Medication: Qsymia    Denial Date: 7/1/2024    Denial Rational:     We ve received a request for coverage of QSYMIA CPMP 24HR. We ve completed a review of  your benefit plan and the details of the request.  Following this review, we ve found that you are not eligible for coverage of this item or service.  This means that if you decide to proceed with it, you ll be responsible for the cost.  Here is the reason for our decision:  Your request for coverage of this medication is denied because it is not a covered benefit under  your plan. Please see your plan document which describes weight loss drug coverage.    Appeal Information:  Drug exclusions can not be appealed.  This medication will not be covered by the prescription plan for any reason. The drug is not on formulary and there are no loopholes to gaining approval.

## 2024-07-09 ENCOUNTER — TRANSFERRED RECORDS (OUTPATIENT)
Dept: HEALTH INFORMATION MANAGEMENT | Facility: CLINIC | Age: 44
End: 2024-07-09
Payer: COMMERCIAL

## 2024-07-26 RX ORDER — TOPIRAMATE SPINKLE 15 MG/1
CAPSULE ORAL
Qty: 325 CAPSULE | Refills: 0 | Status: SHIPPED | OUTPATIENT
Start: 2024-07-26 | End: 2024-07-31

## 2024-07-26 RX ORDER — PHENTERMINE HYDROCHLORIDE 15 MG/1
15 CAPSULE ORAL
Qty: 30 CAPSULE | Refills: 1 | Status: SHIPPED | OUTPATIENT
Start: 2024-07-26 | End: 2024-07-31

## 2024-07-26 NOTE — TELEPHONE ENCOUNTER
Orders placed for phentermine and topiramate.   Please let her know that the topiramate is a taper, with a gradually increasing dose.    We'll see if insurance covers it.    I would like her to have a kidney lab prior to starting the meds. She can schedule a lab visit at her convenience.    Germania was seen today for prior authorization.    Diagnoses and all orders for this visit:    Morbid obesity (H)  -     phentermine 15 MG capsule; Take 1 capsule (15 mg) by mouth daily (with breakfast)  -     topiramate (TOPAMAX) 15 MG capsule; Take 1 capsule (15 mg) by mouth daily (with breakfast) for 7 days, THEN 1 capsule (15 mg) 2 times daily for 7 days, THEN 2 capsules (30 mg) 2 times daily for 76 days.      Future Appointments   Date Time Provider Department Center   9/23/2024  7:00 AM Brooke Pal MD [facility]

## 2024-07-26 NOTE — TELEPHONE ENCOUNTER
First attempt: No answer. LVM. Please relay message below if patient returns call. MyChart message sent.    Monty PATEL RN  [facility]

## 2024-07-29 ENCOUNTER — LAB (OUTPATIENT)
Dept: LAB | Facility: CLINIC | Age: 44
End: 2024-07-29
Payer: COMMERCIAL

## 2024-07-29 DIAGNOSIS — E66.01 MORBID OBESITY (H): ICD-10-CM

## 2024-07-29 LAB
ALBUMIN SERPL BCG-MCNC: 4.4 G/DL (ref 3.5–5.2)
ANION GAP SERPL CALCULATED.3IONS-SCNC: 11 MMOL/L (ref 7–15)
BUN SERPL-MCNC: 16.5 MG/DL (ref 6–20)
CALCIUM SERPL-MCNC: 10.4 MG/DL (ref 8.8–10.4)
CHLORIDE SERPL-SCNC: 107 MMOL/L (ref 98–107)
CREAT SERPL-MCNC: 0.92 MG/DL (ref 0.51–0.95)
EGFRCR SERPLBLD CKD-EPI 2021: 78 ML/MIN/1.73M2
GLUCOSE SERPL-MCNC: 79 MG/DL (ref 70–99)
HCO3 SERPL-SCNC: 23 MMOL/L (ref 22–29)
PHOSPHATE SERPL-MCNC: 3.9 MG/DL (ref 2.5–4.5)
POTASSIUM SERPL-SCNC: 4.1 MMOL/L (ref 3.4–5.3)
SODIUM SERPL-SCNC: 141 MMOL/L (ref 135–145)

## 2024-07-29 PROCEDURE — 36415 COLL VENOUS BLD VENIPUNCTURE: CPT

## 2024-07-29 PROCEDURE — 80069 RENAL FUNCTION PANEL: CPT

## 2024-07-29 NOTE — TELEPHONE ENCOUNTER
Writer called and spoke to patient. Relayed clinician's message below. Pt verbalized understanding and in agreement with plan. Scheduled lab appointment later this afternoon.     Monty PATEL RN  [facility]

## 2024-07-30 ENCOUNTER — MYC MEDICAL ADVICE (OUTPATIENT)
Dept: FAMILY MEDICINE | Facility: CLINIC | Age: 44
End: 2024-07-30
Payer: COMMERCIAL

## 2024-07-30 DIAGNOSIS — E78.5 DYSLIPIDEMIA (HIGH LDL; LOW HDL): ICD-10-CM

## 2024-07-30 DIAGNOSIS — I82.451 ACUTE DEEP VEIN THROMBOSIS (DVT) OF RIGHT PERONEAL VEIN (H): ICD-10-CM

## 2024-07-30 DIAGNOSIS — E66.01 MORBID OBESITY (H): ICD-10-CM

## 2024-07-30 DIAGNOSIS — E28.2 POLYCYSTIC OVARIAN SYNDROME: ICD-10-CM

## 2024-07-30 NOTE — TELEPHONE ENCOUNTER
I thought the Qysmia wasn't covered by insurance, so I changed to the generics. If Qysmia is covered by insurance, that's better and she should continue it!

## 2024-07-30 NOTE — TELEPHONE ENCOUNTER
Message routed to Dr Pal for review / plan    Lindsay Pride RN  [facility]    Germania Cheung [facility] - Primary [facility]  Phone Number: [phone]     Hejessica Pal -  I saw the new prescription for topomax.  Should I stop with  the qsymia (I have been taking the 7.5 dose for about 2 weeks now) and jump into the new one following those instructions?      Thanks for seeing me through this!  Germania

## 2024-07-30 NOTE — TELEPHONE ENCOUNTER
"Reply to pt via [employer] per Dr Pal response below.     \"I thought the Qysmia wasn't covered by insurance, so I changed to the generics. If Qysmia is covered by insurance, that's better and she should continue it!\"    Kelsea MORENO RN  [facility]    " 3

## 2024-07-31 RX ORDER — PHENTERMINE AND TOPIRAMATE 7.5; 46 MG/1; MG/1
1 CAPSULE, EXTENDED RELEASE ORAL DAILY
Qty: 90 CAPSULE | Refills: 4 | Status: SHIPPED | OUTPATIENT
Start: 2024-07-31 | End: 2024-09-23

## 2024-07-31 NOTE — TELEPHONE ENCOUNTER
"Dr Pal,     Per pt, Qysmia is  \"not covered, but I have a relatively hefty hsa/veba account, so it is not an issue to afford that one, as long as the upper doses don't go up astronomically.  If it is the better medication, why don't I stick with Qsymia?  I'm feeling positive with the results I'm seeing thus far! \"     Please reorder Qysmia.     Kelsea MORENO RN  [facility]    "

## 2024-07-31 NOTE — TELEPHONE ENCOUNTER
Qsymia refilled.    Morbid obesity (H)  -     Phentermine-Topiramate (QSYMIA) 7.5-46 MG CP24; Take 1 capsule by mouth tirso

## 2024-08-06 ENCOUNTER — TRANSFERRED RECORDS (OUTPATIENT)
Dept: HEALTH INFORMATION MANAGEMENT | Facility: CLINIC | Age: 44
End: 2024-08-06
Payer: COMMERCIAL

## 2024-09-03 ENCOUNTER — PATIENT OUTREACH (OUTPATIENT)
Dept: CARE COORDINATION | Facility: CLINIC | Age: 44
End: 2024-09-03
Payer: COMMERCIAL

## 2024-09-17 ENCOUNTER — PATIENT OUTREACH (OUTPATIENT)
Dept: CARE COORDINATION | Facility: CLINIC | Age: 44
End: 2024-09-17
Payer: COMMERCIAL

## 2024-09-19 ENCOUNTER — PATIENT OUTREACH (OUTPATIENT)
Dept: CARE COORDINATION | Facility: CLINIC | Age: 44
End: 2024-09-19
Payer: COMMERCIAL

## 2024-09-22 ASSESSMENT — PATIENT HEALTH QUESTIONNAIRE - PHQ9
SUM OF ALL RESPONSES TO PHQ QUESTIONS 1-9: 1
SUM OF ALL RESPONSES TO PHQ QUESTIONS 1-9: 1

## 2024-09-23 ENCOUNTER — VIRTUAL VISIT (OUTPATIENT)
Dept: FAMILY MEDICINE | Facility: CLINIC | Age: 44
End: 2024-09-23
Attending: FAMILY MEDICINE
Payer: COMMERCIAL

## 2024-09-23 DIAGNOSIS — I82.451 ACUTE DEEP VEIN THROMBOSIS (DVT) OF RIGHT PERONEAL VEIN (H): ICD-10-CM

## 2024-09-23 DIAGNOSIS — Z12.31 VISIT FOR SCREENING MAMMOGRAM: ICD-10-CM

## 2024-09-23 DIAGNOSIS — E28.2 POLYCYSTIC OVARIAN SYNDROME: ICD-10-CM

## 2024-09-23 DIAGNOSIS — F33.0 MAJOR DEPRESSIVE DISORDER, RECURRENT EPISODE, MILD (H): ICD-10-CM

## 2024-09-23 DIAGNOSIS — I26.94 MULTIPLE SUBSEGMENTAL PULMONARY EMBOLI WITHOUT ACUTE COR PULMONALE (H): ICD-10-CM

## 2024-09-23 DIAGNOSIS — E78.5 DYSLIPIDEMIA (HIGH LDL; LOW HDL): ICD-10-CM

## 2024-09-23 DIAGNOSIS — E66.01 MORBID OBESITY (H): Primary | ICD-10-CM

## 2024-09-23 PROCEDURE — 99213 OFFICE O/P EST LOW 20 MIN: CPT | Mod: 95 | Performed by: FAMILY MEDICINE

## 2024-09-23 RX ORDER — PHENTERMINE AND TOPIRAMATE 11.25; 69 MG/1; MG/1
1 CAPSULE, EXTENDED RELEASE ORAL DAILY
Qty: 90 CAPSULE | Refills: 4 | Status: SHIPPED | OUTPATIENT
Start: 2024-09-23

## 2024-09-23 RX ORDER — PHENTERMINE AND TOPIRAMATE 7.5; 46 MG/1; MG/1
1 CAPSULE, EXTENDED RELEASE ORAL DAILY
Qty: 90 CAPSULE | Refills: 4 | Status: SHIPPED | OUTPATIENT
Start: 2024-09-23 | End: 2024-09-23 | Stop reason: DRUGHIGH

## 2024-09-23 NOTE — PROGRESS NOTES
"____________________________    Virtual Visit - Video Encounter  [facility]  Family Medicine  Date of Service: 9/23/2024    Subjective:  Chief Complaint   Patient presents with    Recheck Medication      Weight loss.  Doing a pretty decent job.   Feels like time to ramp up.   No side effects  Shuts off food noise - doesn't think about it all the time.  Busy with work.  Seems to be plateauing - partly due to more stress related to work. Weight loss is slowing a bit as well.         7/10/2023     2:58 PM 8/18/2023     1:00 PM 9/22/2024     9:36 PM   PHQ   PHQ-9 Total Score 2 1 1   Q9: Thoughts of better off dead/self-harm past 2 weeks Not at all Not at all Not at all         Objective:  Vitals - Patient Reported  Weight (Patient Reported): 136.5 kg (301 lb)  Height (Patient Reported): 175.3 cm (5' 9\")  BMI (Based on Pt Reported Ht/Wt): 44.45    Wt Readings from Last 6 Encounters:   05/30/24 147 kg (324 lb)   10/02/23 146.1 kg (322 lb)   08/19/22 144.7 kg (319 lb)   05/02/22 139.7 kg (308 lb)   04/25/22 140.2 kg (309 lb)   04/25/22 140.2 kg (309 lb)     BMI Readings from Last 6 Encounters:   05/30/24 47.85 kg/m    10/02/23 46.67 kg/m    08/19/22 46.19 kg/m    05/02/22 45.48 kg/m    04/25/22 45.63 kg/m    04/25/22 45.96 kg/m      GENERAL: alert and no distress  EYES: Eyes grossly normal to inspection.  No discharge or erythema, or obvious scleral/conjunctival abnormalities.  RESP: No audible wheeze, cough, or visible cyanosis.    SKIN: Visible skin clear. No significant rash, abnormal pigmentation or lesions.  NEURO: Cranial nerves grossly intact.  Mentation and speech appropriate for age.  PSYCH: Appropriate affect, tone, and pace of words   No visits with results within 1 Week(s) from this visit.   Latest known visit with results is:   Lab on 07/29/2024   Component Date Value Ref Range Status    Sodium 07/29/2024 141  135 - 145 mmol/L Final    Potassium 07/29/2024 4.1  3.4 - 5.3 mmol/L Final "    Chloride 07/29/2024 107  98 - 107 mmol/L Final    Carbon Dioxide (CO2) 07/29/2024 23  22 - 29 mmol/L Final    Anion Gap 07/29/2024 11  7 - 15 mmol/L Final    Glucose 07/29/2024 79  70 - 99 mg/dL Final    Urea Nitrogen 07/29/2024 16.5  6.0 - 20.0 mg/dL Final    Creatinine 07/29/2024 0.92  0.51 - 0.95 mg/dL Final    GFR Estimate 07/29/2024 78  >60 mL/min/1.73m2 Final    eGFR calculated using 2021 CKD-EPI equation.    Calcium 07/29/2024 10.4  8.8 - 10.4 mg/dL Final    Reference intervals for this test were updated on 7/16/2024 to reflect our healthy population more accurately. There may be differences in the flagging of prior results with similar values performed with this method. Those prior results can be interpreted in the context of the updated reference intervals.    Albumin 07/29/2024 4.4  3.5 - 5.2 g/dL Final    Phosphorus 07/29/2024 3.9  2.5 - 4.5 mg/dL Final     No results found.   Assessment & Plan:  Morbid obesity, BMI 44 with comorbidities of dyslipidemia, PCOS and history of DVT. Weight loss plateauing. Tolerating well. Medication effective.  Increase Qsymia from 7.5/46 to 11.25/69 mg.  Recheck in 3 months.  Schedule physical + mammo.   Major depression is in full remission. On sertraline 200 mg po daily.  Hx PE/VTE, provoked 4/22. Not on hormones. Anticoagulation complete.      Order Summary                                                      Germania was seen today for recheck medication.    Diagnoses and all orders for this visit:    Morbid obesity (H)  -     PRIMARY CARE FOLLOW-UP SCHEDULING  -     Discontinue: Phentermine-Topiramate (QSYMIA) 7.5-46 MG CP24; Take 1 capsule by mouth daily.  -     Phentermine-Topiramate (QSYMIA) 11.25-69 MG CP24; Take 1 capsule by mouth daily.    Dyslipidemia (high LDL; low HDL)  -     PRIMARY CARE FOLLOW-UP SCHEDULING  -     Discontinue: Phentermine-Topiramate (QSYMIA) 7.5-46 MG CP24; Take 1 capsule by mouth daily.  -     Phentermine-Topiramate (QSYMIA) 11.25-69 MG  CP24; Take 1 capsule by mouth daily.    Polycystic ovarian syndrome  -     PRIMARY CARE FOLLOW-UP SCHEDULING  -     Discontinue: Phentermine-Topiramate (QSYMIA) 7.5-46 MG CP24; Take 1 capsule by mouth daily.  -     Phentermine-Topiramate (QSYMIA) 11.25-69 MG CP24; Take 1 capsule by mouth daily.    Acute deep vein thrombosis (DVT) of right peroneal vein (H)  -     PRIMARY CARE FOLLOW-UP SCHEDULING  -     Discontinue: Phentermine-Topiramate (QSYMIA) 7.5-46 MG CP24; Take 1 capsule by mouth daily.  -     Phentermine-Topiramate (QSYMIA) 11.25-69 MG CP24; Take 1 capsule by mouth daily.    Visit for screening mammogram  -     MA Screen Bilateral w/[place]; Future    Multiple subsegmental pulmonary emboli without acute cor pulmonale (H)    Major depressive disorder, recurrent episode, mild (H24)    Other orders  -     PRIMARY CARE FOLLOW-UP SCHEDULING; Future        No future appointments.    Completed by: Brooke Pal M.D., [facility]. 9/23/2024 7:03 AM.  This transcription uses voice recognition software, which may contain typographical errors.  ____________________________    Start visit: 7:03 AM. End visit: 7:16 AM  Physician location: [facility], on site.  Patient location: Home  Platform: Tech urSelf    Answers submitted by the patient for this visit:  Patient Health Questionnaire (Submitted on 9/22/2024)  PHQ9 TOTAL SCORE: 1  General Questionnaire (Submitted on 9/22/2024)  Chief Complaint: Chronic problems general questions HPI Form  What is the reason for your visit today? : Medication check  How many servings of fruits and vegetables do you eat daily?: 2-3  On average, how many sweetened beverages do you drink each day (Examples: soda, juice, sweet tea, etc.  Do NOT count diet or artificially sweetened beverages)?: 0  How many minutes a day do you exercise enough to make your heart beat faster?: 20 to 29  How many days a week do you exercise enough to make your heart beat faster?:  4  How many days per week do you miss taking your medication?: 0

## 2024-09-24 ENCOUNTER — TELEPHONE (OUTPATIENT)
Dept: FAMILY MEDICINE | Facility: CLINIC | Age: 44
End: 2024-09-24
Payer: COMMERCIAL

## 2024-09-24 NOTE — TELEPHONE ENCOUNTER
Central Prior Authorization Team  Phone: [phone]    PRIOR AUTHORIZATION DENIED    Medication: QSYMIA 11.25-69 MG PO CP24  Insurance Company: [employer] - Phone [phone] Fax [phone]  Denial Date: 9/23/2024  Denial Reason(s):         Appeal Information:           Patient Notified: NO- Unfortunately, we cannot call the patient with denials because we do not know what next steps the MD will take nor can we give medical advice, please notify the patient of what they are to expect for the continuation of their therapy from the provider.

## 2024-09-26 ENCOUNTER — MYC REFILL (OUTPATIENT)
Dept: FAMILY MEDICINE | Facility: CLINIC | Age: 44
End: 2024-09-26
Payer: COMMERCIAL

## 2024-09-26 DIAGNOSIS — L30.9 ECZEMA: ICD-10-CM

## 2024-09-29 RX ORDER — TRIAMCINOLONE ACETONIDE 1 MG/G
CREAM TOPICAL 2 TIMES DAILY PRN
Qty: 80 G | Refills: 1 | Status: SHIPPED | OUTPATIENT
Start: 2024-09-29

## 2024-10-17 ENCOUNTER — PATIENT OUTREACH (OUTPATIENT)
Dept: CARE COORDINATION | Facility: CLINIC | Age: 44
End: 2024-10-17
Payer: COMMERCIAL

## 2024-11-05 ENCOUNTER — HOSPITAL ENCOUNTER (OUTPATIENT)
Dept: MAMMOGRAPHY | Facility: CLINIC | Age: 44
Discharge: HOME | End: 2024-11-05
Attending: FAMILY MEDICINE | Admitting: FAMILY MEDICINE
Payer: COMMERCIAL

## 2024-11-05 DIAGNOSIS — Z12.31 VISIT FOR SCREENING MAMMOGRAM: ICD-10-CM

## 2024-11-05 PROCEDURE — 77063 BREAST TOMOSYNTHESIS BI: CPT

## 2024-12-01 ENCOUNTER — HEALTH MAINTENANCE LETTER (OUTPATIENT)
Age: 44
End: 2024-12-01

## 2024-12-31 DIAGNOSIS — F33.0 MAJOR DEPRESSIVE DISORDER, RECURRENT EPISODE, MILD (H): ICD-10-CM

## 2024-12-31 DIAGNOSIS — F41.1 GENERALIZED ANXIETY DISORDER: ICD-10-CM

## 2024-12-31 RX ORDER — SERTRALINE HYDROCHLORIDE 100 MG/1
200 TABLET, FILM COATED ORAL DAILY
Qty: 180 TABLET | Refills: 4 | Status: SHIPPED | OUTPATIENT
Start: 2024-12-31

## 2025-03-10 SDOH — HEALTH STABILITY: PHYSICAL HEALTH: ON AVERAGE, HOW MANY MINUTES DO YOU ENGAGE IN EXERCISE AT THIS LEVEL?: 30 MIN

## 2025-03-10 SDOH — HEALTH STABILITY: PHYSICAL HEALTH: ON AVERAGE, HOW MANY DAYS PER WEEK DO YOU ENGAGE IN MODERATE TO STRENUOUS EXERCISE (LIKE A BRISK WALK)?: 3 DAYS

## 2025-03-10 ASSESSMENT — SOCIAL DETERMINANTS OF HEALTH (SDOH): HOW OFTEN DO YOU GET TOGETHER WITH FRIENDS OR RELATIVES?: ONCE A WEEK

## 2025-03-11 ENCOUNTER — OFFICE VISIT (OUTPATIENT)
Dept: FAMILY MEDICINE | Facility: CLINIC | Age: 45
End: 2025-03-11
Payer: COMMERCIAL

## 2025-03-11 ENCOUNTER — TELEPHONE (OUTPATIENT)
Dept: FAMILY MEDICINE | Facility: CLINIC | Age: 45
End: 2025-03-11

## 2025-03-11 VITALS
HEIGHT: 70 IN | DIASTOLIC BLOOD PRESSURE: 73 MMHG | HEART RATE: 80 BPM | OXYGEN SATURATION: 95 % | WEIGHT: 293 LBS | RESPIRATION RATE: 20 BRPM | SYSTOLIC BLOOD PRESSURE: 106 MMHG | TEMPERATURE: 96.8 F | BODY MASS INDEX: 41.95 KG/M2

## 2025-03-11 DIAGNOSIS — Z13.6 SCREENING FOR CARDIOVASCULAR CONDITION: ICD-10-CM

## 2025-03-11 DIAGNOSIS — Z00.00 ROUTINE GENERAL MEDICAL EXAMINATION AT A HEALTH CARE FACILITY: Primary | ICD-10-CM

## 2025-03-11 DIAGNOSIS — Z86.718 PERSONAL HISTORY OF DVT (DEEP VEIN THROMBOSIS): ICD-10-CM

## 2025-03-11 DIAGNOSIS — F41.1 GENERALIZED ANXIETY DISORDER: ICD-10-CM

## 2025-03-11 DIAGNOSIS — Z12.11 SCREEN FOR COLON CANCER: ICD-10-CM

## 2025-03-11 DIAGNOSIS — E66.01 MORBID OBESITY (H): ICD-10-CM

## 2025-03-11 DIAGNOSIS — F33.0 MAJOR DEPRESSIVE DISORDER, RECURRENT EPISODE, MILD: ICD-10-CM

## 2025-03-11 DIAGNOSIS — J30.2 SEASONAL ALLERGIC RHINITIS, UNSPECIFIED TRIGGER: ICD-10-CM

## 2025-03-11 DIAGNOSIS — Z86.711 HISTORY OF PULMONARY EMBOLISM: ICD-10-CM

## 2025-03-11 DIAGNOSIS — Z12.4 CERVICAL CANCER SCREENING: ICD-10-CM

## 2025-03-11 DIAGNOSIS — Z01.818 TUBAL LIGATION EVALUATION: ICD-10-CM

## 2025-03-11 PROBLEM — I26.94 MULTIPLE SUBSEGMENTAL PULMONARY EMBOLI WITHOUT ACUTE COR PULMONALE (H): Status: RESOLVED | Noted: 2022-04-25 | Resolved: 2025-03-11

## 2025-03-11 PROBLEM — I82.451 ACUTE DEEP VEIN THROMBOSIS (DVT) OF RIGHT PERONEAL VEIN (H): Status: RESOLVED | Noted: 2022-04-26 | Resolved: 2025-03-11

## 2025-03-11 LAB
CHOLEST SERPL-MCNC: 225 MG/DL
FASTING STATUS PATIENT QL REPORTED: ABNORMAL
HDLC SERPL-MCNC: 44 MG/DL
LDLC SERPL CALC-MCNC: 152 MG/DL
NONHDLC SERPL-MCNC: 181 MG/DL
TRIGL SERPL-MCNC: 145 MG/DL

## 2025-03-11 PROCEDURE — 99214 OFFICE O/P EST MOD 30 MIN: CPT | Mod: 25 | Performed by: FAMILY MEDICINE

## 2025-03-11 PROCEDURE — 87624 HPV HI-RISK TYP POOLED RSLT: CPT | Performed by: FAMILY MEDICINE

## 2025-03-11 PROCEDURE — 3078F DIAST BP <80 MM HG: CPT | Performed by: FAMILY MEDICINE

## 2025-03-11 PROCEDURE — 80061 LIPID PANEL: CPT | Performed by: FAMILY MEDICINE

## 2025-03-11 PROCEDURE — 36415 COLL VENOUS BLD VENIPUNCTURE: CPT | Performed by: FAMILY MEDICINE

## 2025-03-11 PROCEDURE — 99396 PREV VISIT EST AGE 40-64: CPT | Performed by: FAMILY MEDICINE

## 2025-03-11 PROCEDURE — 3074F SYST BP LT 130 MM HG: CPT | Performed by: FAMILY MEDICINE

## 2025-03-11 RX ORDER — FLUTICASONE PROPIONATE 50 MCG
2 SPRAY, SUSPENSION (ML) NASAL DAILY
Qty: 16 G | Refills: 11 | Status: SHIPPED | OUTPATIENT
Start: 2025-03-11

## 2025-03-11 RX ORDER — SERTRALINE HYDROCHLORIDE 100 MG/1
200 TABLET, FILM COATED ORAL DAILY
Qty: 180 TABLET | Refills: 4 | Status: SHIPPED | OUTPATIENT
Start: 2025-03-11

## 2025-03-11 RX ORDER — PHENTERMINE AND TOPIRAMATE 15; 92 MG/1; MG/1
1 CAPSULE, EXTENDED RELEASE ORAL DAILY
Qty: 90 CAPSULE | Refills: 4 | Status: SHIPPED | OUTPATIENT
Start: 2025-03-11

## 2025-03-11 ASSESSMENT — PATIENT HEALTH QUESTIONNAIRE - PHQ9: SUM OF ALL RESPONSES TO PHQ QUESTIONS 1-9: 0

## 2025-03-11 NOTE — TELEPHONE ENCOUNTER
Retail Pharmacy Prior Authorization Team   Phone: [phone]    PRIOR AUTHORIZATION DENIED    Medication: QSYMIA 15-92 MG PO CP24  Insurance Company: [employer] - Phone [phone] Fax [phone]  Denial Date: 3/11/2025  Denial Reason(s): WEIGHT LOSS DRUGS ARE EXCLUDED FROM COVERAGE      Appeal Information: N/A  Patient Notified: NO

## 2025-03-11 NOTE — PROGRESS NOTES
"Preventive Care Visit  [facility]  Brooke Pal MD, Family Medicine  Mar 11, 2025      Assessment & Plan     Routine general medical examination at a health care facility    Personal history of DVT (deep vein thrombosis)  History of pulmonary embolism  Provoked, not on anticoagulation.     Screening for cardiovascular condition  - Lipid panel reflex to direct LDL Non-fasting; Future  - Lipid panel reflex to direct LDL Non-fasting    Screen for colon cancer  - Colonoscopy Screening  Referral; Future    Cervical cancer screening  - HPV and Gynecologic Cytology Panel - Recommended Age 30 - 65 Years    Morbid obesity (H)  Well-tolerated, gradual weight loss.   - Phentermine-Topiramate (QSYMIA) 15-92 MG CP24; Take 1 capsule by mouth daily.    Major depressive disorder, recurrent episode, mild  Generalized anxiety disorder  Well-controlled  - sertraline (ZOLOFT) 100 MG tablet; Take 2 tablets (200 mg) by mouth daily.    Seasonal allergic rhinitis, unspecified trigger  - fluticasone (FLONASE) 50 MCG/ACT nasal spray; Spray 2 sprays into both nostrils daily.    Tubal ligation evaluation  Desire for permanent contraception. FH breast cancer. Personal history VTE.  - Ob/Gyn  Referral; Future    BMI  Estimated body mass index is 42.81 kg/m  as calculated from the following:    Height as of this encounter: 1.78 m (5' 10.08\").    Weight as of this encounter: 135.6 kg (299 lb).   Weight management plan: Discussed healthy diet and exercise guidelines    Counseling  Appropriate preventive services were addressed with this patient via screening, questionnaire, or discussion as appropriate for fall prevention, nutrition, physical activity, Tobacco-use cessation, social engagement, weight loss and cognition.  Checklist reviewing preventive services available has been given to the patient.  Reviewed patient's diet, addressing concerns and/or questions.   She is at risk for lack of exercise " and has been provided with information to increase physical activity for the benefit of her well-being.     Iraida Solo is a 44 year old, presenting for the following:  Physical        3/11/2025     9:02 AM   Additional Questions   Roomed by Cony      Advance Care Planning  Patient does not have a Health Care Directive: Discussed advance care planning with patient; however, patient declined at this time.      3/10/2025   General Health   How would you rate your overall physical health? Good   Feel stress (tense, anxious, or unable to sleep) Only a little   (!) STRESS CONCERN      3/10/2025   Nutrition   Three or more servings of calcium each day? Yes   Diet: Regular (no restrictions)   How many servings of fruit and vegetables per day? (!) 2-3   How many sweetened beverages each day? 0-1         3/10/2025   Exercise   Days per week of moderate/strenous exercise 3 days   Average minutes spent exercising at this level 30 min         3/10/2025   Social Factors   Frequency of gathering with friends or relatives Once a week   Worry food won't last until get money to buy more No   Food not last or not have enough money for food? No   Do you have housing? (Housing is defined as stable permanent housing and does not include staying ouside in a car, in a tent, in an abandoned building, in an overnight shelter, or couch-surfing.) Yes   Are you worried about losing your housing? No   Lack of transportation? No   Unable to get utilities (heat,electricity)? No         3/10/2025   Dental   Dentist two times every year? Yes         Today's PHQ-9 Score:       3/11/2025     9:03 AM   PHQ-9 SCORE   PHQ-9 Total Score 0         3/10/2025   Substance Use   Alcohol more than 3/day or more than 7/wk No   Do you use any other substances recreationally? No     Social History     Tobacco Use    Smoking status: Former     Current packs/day: 0.00     Average packs/day: 0.5 packs/day for 8.0 years (4.0 ttl pk-yrs)     Types: Cigarettes      Start date: 1997     Quit date: 2005     Years since quittin.7    Smokeless tobacco: Never   Vaping Use    Vaping status: Never Used   Substance Use Topics    Alcohol use: Yes     Comment: Alcoholic Drinks/day: 2-3 drinks per week    Drug use: No           2024   LAST FHS-7 RESULTS   1st degree relative breast or ovarian cancer No   Any relative bilateral breast cancer No   Any male have breast cancer No   Any ONE woman have BOTH breast AND ovarian cancer No   Any woman with breast cancer before 50yrs No   2 or more relatives with breast AND/OR ovarian cancer No   2 or more relatives with breast AND/OR bowel cancer No              3/10/2025   STI Screening   New sexual partner(s) since last STI/HIV test? No     History of abnormal Pap smear: No - age 30-64 HPV with reflex Pap every 5 years recommended        Latest Ref Rng & Units 2019     2:17 PM 2014     9:25 AM   PAP / HPV   PAP Negative for squamous intraepithelial lesion or malignancy. Negative for squamous intraepithelial lesion or malignancy  Electronically signed by Eliot oChen CT (ASCP) on 2020 at 10:35 AM    Negative for squamous intraepithelial lesion or malignancy  Electronically signed by Shreya Baez CT (ASCP) on 2014 at  8:02 AM      HPV 16 DNA NEG Negative     HPV 18 DNA NEG Negative     Other HR HPV NEG Negative       ASCVD Risk   The 10-year ASCVD risk score (Vincent KEARNEY, et al., 2019) is: 0.9%    Values used to calculate the score:      Age: 44 years      Sex: Female      Is Non- : No      Diabetic: No      Tobacco smoker: No      Systolic Blood Pressure: 106 mmHg      Is BP treated: No      HDL Cholesterol: 39 mg/dL      Total Cholesterol: 193 mg/dL        3/10/2025   Contraception/Family Planning   Questions about contraception or family planning (!) YES - interested in tubal         Reviewed and updated as needed this visit by Provider    Allergies  Meds   "Problems  Med Hx               Objective    Exam  /73 (BP Location: Right arm, Patient Position: Sitting, Cuff Size: Adult Regular)   Pulse 80   Temp 96.8  F (36  C) (Temporal)   Resp 20   Ht 1.78 m (5' 10.08\")   Wt 135.6 kg (299 lb)   LMP 03/10/2025 (Approximate)   SpO2 95%   BMI 42.81 kg/m     Estimated body mass index is 42.81 kg/m  as calculated from the following:    Height as of this encounter: 1.78 m (5' 10.08\").    Weight as of this encounter: 135.6 kg (299 lb).  Wt Readings from Last 6 Encounters:   03/11/25 135.6 kg (299 lb)   05/30/24 147 kg (324 lb)   10/02/23 146.1 kg (322 lb)   08/19/22 144.7 kg (319 lb)   05/02/22 139.7 kg (308 lb)   04/25/22 140.2 kg (309 lb)       Physical Exam  GENERAL: alert and no distress  EYES: Eyes grossly normal to inspection, PERRL and conjunctivae and sclerae normal  HENT: ear canals and TM's normal, nose and mouth without ulcers or lesions  NECK: no adenopathy, no asymmetry, masses, or scars  RESP: lungs clear to auscultation - no rales, rhonchi or wheezes  BREAST: normal without masses, tenderness or nipple discharge and no palpable axillary masses or adenopathy  CV: regular rate and rhythm, normal S1 S2, no S3 or S4, no murmur, click or rub, no peripheral edema  ABDOMEN: soft, nontender, no hepatosplenomegaly, no masses and bowel sounds normal   (female): normal female external genitalia, normal urethral meatus, normal vaginal mucosa  MS: no gross musculoskeletal defects noted, no edema  SKIN: no suspicious lesions or rashes  NEURO: Normal strength and tone, mentation intact and speech normal  PSYCH: mentation appears normal, affect normal/bright        Signed Electronically by: Brooke Pal MD  Prior to immunization administration, verified patients identity using patient s name and date of birth. Please see Immunization Activity for additional information.     Screening Questionnaire for Adult Immunization    Are you sick today?   No   Do you have " allergies to medications, food, a vaccine component or latex?   No   Have you ever had a serious reaction after receiving a vaccination?   No   Do you have a long-term health problem with heart, lung, kidney, or metabolic disease (e.g., diabetes), asthma, a blood disorder, no spleen, complement component deficiency, a cochlear implant, or a spinal fluid leak?  Are you on long-term aspirin therapy?   No   Do you have cancer, leukemia, HIV/AIDS, or any other immune system problem?   No   Do you have a parent, brother, or sister with an immune system problem?   No   In the past 3 months, have you taken medications that affect  your immune system, such as prednisone, other steroids, or anticancer drugs; drugs for the treatment of rheumatoid arthritis, Crohn s disease, or psoriasis; or have you had radiation treatments?   No   Have you had a seizure, or a brain or other nervous system problem?   No   During the past year, have you received a transfusion of blood or blood    products, or been given immune (gamma) globulin or antiviral drug?   No   For women: Are you pregnant or is there a chance you could become       pregnant during the next month?   No   Have you received any vaccinations in the past 4 weeks?   No     Immunization questionnaire answers were all negative.      Patient instructed to remain in clinic for 15 minutes afterwards, and to report any adverse reactions.     Screening performed by Cony Schumacher on 3/11/2025 at 9:07 AM.

## 2025-03-11 NOTE — TELEPHONE ENCOUNTER
Writer spoke to pt, relayed PA denial for Qsymia. Pt verb understanding. Writer inquired if pt would like clinician to try an alternative medication. Pt declined, states she has been paying OOP and was just trying to see if insurance covers it.    No further action required.     Kelsea MORENO RN  [facility]

## 2025-03-11 NOTE — PATIENT INSTRUCTIONS
Patient Education   Preventive Care Advice   This is general advice given by our system to help you stay healthy. However, your care team may have specific advice just for you. Please talk to your care team about your preventive care needs.  Nutrition  Eat 5 or more servings of fruits and vegetables each day.  Try wheat bread, brown rice and whole grain pasta (instead of white bread, rice, and pasta).  Get enough calcium and vitamin D. Check the label on foods and aim for 100% of the RDA (recommended daily allowance).  Lifestyle  Exercise at least 150 minutes each week  (30 minutes a day, 5 days a week).  Do muscle strengthening activities 2 days a week. These help control your weight and prevent disease.  No smoking.  Wear sunscreen to prevent skin cancer.  Have a dental exam and cleaning every 6 months.  Yearly exams  See your health care team every year to talk about:  Any changes in your health.  Any medicines your care team has prescribed.  Preventive care, family planning, and ways to prevent chronic diseases.  Shots (vaccines)   HPV shots (up to age 26), if you've never had them before.  Hepatitis B shots (up to age 59), if you've never had them before.  COVID-19 shot: Get this shot when it's due.  Flu shot: Get a flu shot every year.  Tetanus shot: Get a tetanus shot every 10 years.  Pneumococcal, hepatitis A, and RSV shots: Ask your care team if you need these based on your risk.  Shingles shot (for age 50 and up)  General health tests  Diabetes screening:  Starting at age 35, Get screened for diabetes at least every 3 years.  If you are younger than age 35, ask your care team if you should be screened for diabetes.  Cholesterol test: At age 39, start having a cholesterol test every 5 years, or more often if advised.  Bone density scan (DEXA): At age 50, ask your care team if you should have this scan for osteoporosis (brittle bones).  Hepatitis C: Get tested at least once in your life.  STIs (sexually  transmitted infections)  Before age 24: Ask your care team if you should be screened for STIs.  After age 24: Get screened for STIs if you're at risk. You are at risk for STIs (including HIV) if:  You are sexually active with more than one person.  You don't use condoms every time.  You or a partner was diagnosed with a sexually transmitted infection.  If you are at risk for HIV, ask about PrEP medicine to prevent HIV.  Get tested for HIV at least once in your life, whether you are at risk for HIV or not.  Cancer screening tests  Cervical cancer screening: If you have a cervix, begin getting regular cervical cancer screening tests starting at age 21.  Breast cancer scan (mammogram): If you've ever had breasts, begin having regular mammograms starting at age 40. This is a scan to check for breast cancer.  Colon cancer screening: It is important to start screening for colon cancer at age 45.  Have a colonoscopy test every 10 years (or more often if you're at risk) Or, ask your provider about stool tests like a FIT test every year or Cologuard test every 3 years.  To learn more about your testing options, visit:   .  For help making a decision, visit:   https://bit.ly/cn68312.  Prostate cancer screening test: If you have a prostate, ask your care team if a prostate cancer screening test (PSA) at age 55 is right for you.  Lung cancer screening: If you are a current or former smoker ages 50 to 80, ask your care team if ongoing lung cancer screenings are right for you.  For informational purposes only. Not to replace the advice of your health care provider. Copyright   2023 [employer]. All rights reserved. Clinically reviewed by the [facility] Transitions Program. [employer] 026224 - REV 01/24.

## 2025-03-13 LAB
HPV HR 12 DNA CVX QL NAA+PROBE: NEGATIVE
HPV16 DNA CVX QL NAA+PROBE: NEGATIVE
HPV18 DNA CVX QL NAA+PROBE: NEGATIVE
HUMAN PAPILLOMA VIRUS FINAL DIAGNOSIS: NORMAL

## 2025-06-30 ENCOUNTER — TRANSFERRED RECORDS (OUTPATIENT)
Dept: HEALTH INFORMATION MANAGEMENT | Facility: CLINIC | Age: 45
End: 2025-06-30
Payer: COMMERCIAL

## 2025-07-30 ENCOUNTER — OFFICE VISIT (OUTPATIENT)
Dept: FAMILY MEDICINE | Facility: CLINIC | Age: 45
End: 2025-07-30
Payer: COMMERCIAL

## 2025-07-30 VITALS
HEART RATE: 75 BPM | HEIGHT: 70 IN | RESPIRATION RATE: 16 BRPM | DIASTOLIC BLOOD PRESSURE: 84 MMHG | BODY MASS INDEX: 41.95 KG/M2 | SYSTOLIC BLOOD PRESSURE: 112 MMHG | WEIGHT: 293 LBS | OXYGEN SATURATION: 99 % | TEMPERATURE: 97.6 F

## 2025-07-30 DIAGNOSIS — Z86.711 HISTORY OF PULMONARY EMBOLISM: ICD-10-CM

## 2025-07-30 DIAGNOSIS — Z86.718 PERSONAL HISTORY OF DVT (DEEP VEIN THROMBOSIS): ICD-10-CM

## 2025-07-30 DIAGNOSIS — Z01.818 PRE-OP EXAMINATION: Primary | ICD-10-CM

## 2025-07-30 DIAGNOSIS — Z01.818 TUBAL LIGATION EVALUATION: ICD-10-CM

## 2025-07-30 DIAGNOSIS — E66.01 MORBID OBESITY (H): ICD-10-CM

## 2025-07-30 LAB
HCG UR QL: NEGATIVE
HGB BLD-MCNC: 12.7 G/DL (ref 11.7–15.7)
MCV RBC AUTO: 89 FL (ref 78–100)

## 2025-07-30 PROCEDURE — 3074F SYST BP LT 130 MM HG: CPT | Performed by: PHYSICIAN ASSISTANT

## 2025-07-30 PROCEDURE — 81025 URINE PREGNANCY TEST: CPT | Performed by: PHYSICIAN ASSISTANT

## 2025-07-30 PROCEDURE — 3079F DIAST BP 80-89 MM HG: CPT | Performed by: PHYSICIAN ASSISTANT

## 2025-07-30 PROCEDURE — 36415 COLL VENOUS BLD VENIPUNCTURE: CPT | Performed by: PHYSICIAN ASSISTANT

## 2025-07-30 PROCEDURE — 99214 OFFICE O/P EST MOD 30 MIN: CPT | Performed by: PHYSICIAN ASSISTANT

## 2025-07-30 PROCEDURE — 85018 HEMOGLOBIN: CPT | Performed by: PHYSICIAN ASSISTANT

## 2025-07-30 NOTE — PROGRESS NOTES
Prior to immunization administration, verified patients identity using patient s name and date of birth. Please see Immunization Activity for additional information.     Screening Questionnaire for Adult Immunization    Are you sick today?   No   Do you have allergies to medications, food, a vaccine component or latex?   Yes   Have you ever had a serious reaction after receiving a vaccination?   No   Do you have a long-term health problem with heart, lung, kidney, or metabolic disease (e.g., diabetes), asthma, a blood disorder, no spleen, complement component deficiency, a cochlear implant, or a spinal fluid leak?  Are you on long-term aspirin therapy?   No   Do you have cancer, leukemia, HIV/AIDS, or any other immune system problem?   No   Do you have a parent, brother, or sister with an immune system problem?   No   In the past 3 months, have you taken medications that affect  your immune system, such as prednisone, other steroids, or anticancer drugs; drugs for the treatment of rheumatoid arthritis, Crohn s disease, or psoriasis; or have you had radiation treatments?   No   Have you had a seizure, or a brain or other nervous system problem?   No   During the past year, have you received a transfusion of blood or blood    products, or been given immune (gamma) globulin or antiviral drug?   No   For women: Are you pregnant or is there a chance you could become       pregnant during the next month?   No   Have you received any vaccinations in the past 4 weeks?   No     Immunization questionnaire was positive for at least one answer.  Paulino Castle..      Patient instructed to remain in clinic for 15 minutes afterwards, and to report any adverse reactions.     Screening performed by Anjana De La Rosa MA on 7/30/2025 at 8:16 AM.

## 2025-07-30 NOTE — PROGRESS NOTES
Preoperative Evaluation  [facility] [address] [address] [place] [address]  Phone: [phone]  Fax: [phone]  Primary Provider: Brooke Pal MD  Pre-op Performing Provider: Corrine Hall PA-C  Jul 30, 2025 7/29/2025   Surgical Information   What procedure is being done? Fallopian tube remival   Facility or Hospital where procedure/surgery will be performed: [facility]   Who is doing the procedure / surgery? Dr. Ita Olivares   Date of surgery / procedure: 8/8/25   Time of surgery / procedure: AM - TBD   Where do you plan to recover after surgery? at home with family     Fax number for surgical facility: [phone]    Assessment & Plan     The proposed surgical procedure is considered LOW/INTERMEDIATE risk.    1. Pre-op examination (Primary)  2. Tubal ligation evaluation  Performed phentermine-topiramate for 7 days prior to surgery.  Should be OK to restart day after surgery.  - HCG qualitative urine  - Hemoglobin; Future  - Hemoglobin    3. Morbid obesity (H)  Chronic, improving.  Taking phentermine-topiramate.  Will hold this for 7 days prior to surgery.  Continuing to work on healthy eating and exercise.    4. Hx pulmonary embolism  5. Hx DVT  2022, Provoked.  Was on anticoagulation for 3 months after.  HyperCoagulability tests negative.  Not on anticoagulation currently.          - No identified additional risk factors other than previously addressed    Antiplatelet or Anticoagulation Medication Instructions   - We reviewed the medication list and the patient is not on an antiplatelet or anticoagulation medications.    Additional Medication Instructions   - phentermine: DO NOT TAKE 7 days prior to surgery.    Recommendation  Approval given to proceed with proposed procedure, without further diagnostic evaluation.          Iraida Solo is a 44 year old, presenting for the following:  Pre-Op Exam          7/30/2025     8:15 AM    Additional Questions   Roomed by Anjana SULLIVAN   Accompanied by self     HPI:   Desires tubal ligation/salpingectomy for permanent contraception.  Has met with OB/GYN to consult.            7/29/2025   Pre-Op Questionnaire   Have you ever had a heart attack or stroke? No   Have you ever had surgery on your heart or blood vessels, such as a stent placement, a coronary artery bypass, or surgery on an artery in your head, neck, heart, or legs? No   Do you have chest pain with activity? No   Do you have a history of heart failure? No   Do you currently have a cold, bronchitis or symptoms of other infection? No   Do you have a cough, shortness of breath, or wheezing? No   Do you or anyone in your family have previous history of blood clots? (!) YES self   Do you or does anyone in your family have a serious bleeding problem such as prolonged bleeding following surgeries or cuts? No   Have you ever had problems with anemia or been told to take iron pills? No   Have you had any abnormal blood loss such as black, tarry or bloody stools, or abnormal vaginal bleeding? No   Have you ever had a blood transfusion? No   Are you willing to have a blood transfusion if it is medically needed before, during, or after your surgery? Yes   Have you or any of your relatives ever had problems with anesthesia? No   Do you have sleep apnea, excessive snoring or daytime drowsiness? No   Do you have any artifical heart valves or other implanted medical devices like a pacemaker, defibrillator, or continuous glucose monitor? No   Do you have artificial joints? No   Are you allergic to latex? No       Patient Active Problem List    Diagnosis Date Noted    Hx DVT 03/11/2025     Priority: Medium     2022 Provoked: OCP + BMI 45 + Age 40 yo.      Hx pulmonary embolism 03/11/2025     Priority: Medium     2022 Provoked: OCP + BMI 45 + Age 40 yo.      Bilateral carpal tunnel syndrome 02/29/2024     Priority: Medium     By [facility]. [facility].      Morbid  obesity (H) 05/02/2022     Priority: Medium    Dyslipidemia (high LDL; low HDL) 08/16/2018     Priority: Medium     2018        Depression, major, in remission      Priority: Medium    Generalized anxiety disorder      Priority: Medium    Polycystic ovarian syndrome 01/31/2017     Priority: Medium     Secondary amenorrhea and signs of hyperandrogenism (acne and hair growth).    Hormone evaluation otherwise negative.  Has not had pelvic ultrasound.        Obsessive Compulsive Disorder      Priority: Medium    Muscle spasm of back 01/30/2017     Priority: Medium    Eczema      Priority: Medium      Past Medical History:   Diagnosis Date    Acute deep vein thrombosis (DVT) of right peroneal vein (H) 04/26/2022 4/26/2022 Occlusive deep vein thrombosis upper calf region of the right peroneal vein. Provoked: OCP + BMI 45 + Age 42 yo.      Depressive disorder 1999    Multiple subsegmental pulmonary emboli without acute cor pulmonale (H) 04/25/2022 4/25/2022 CT: Pulmonary embolism is present bilaterally, large clot burden, however, no evidence for right heart strain. Provoked: OCP + BMI 45 + Age 42 yo.  4/26/22 Echo: mild concentric LVH, otherwise normal.       Nicotine dependence     7 pack years    Postpartum depression      Past Surgical History:   Procedure Laterality Date    CARPAL TUNNEL RELEASE RT/LT Right 06/24/2024    [facility]    FINGER GANGLION CYST EXCISION Left 01/01/2009    Third finger MCP joint    LAPAROSCOPIC CHOLECYSTECTOMY  01/01/2000    TONSILLECTOMY  01/01/1981     Current Outpatient Medications   Medication Sig Dispense Refill    acetaminophen (TYLENOL) 500 MG tablet Take 1-2 tablets (500-1,000 mg) by mouth every 6 hours as needed for headaches (and adjunct with moderate or severe pain or per patient request)      fluticasone (FLONASE) 50 MCG/ACT nasal spray Spray 2 sprays into both nostrils daily. 16 g 11    Phentermine-Topiramate (QSYMIA) 15-92 MG CP24 Take 1 capsule by mouth daily. 90 capsule 4  "   sertraline (ZOLOFT) 100 MG tablet Take 2 tablets (200 mg) by mouth daily. 180 tablet 4    triamcinolone (KENALOG) 0.1 % external cream Apply topically 2 times daily as needed for irritation. 80 g 1       Allergies   Allergen Reactions    Estrogens Other (See Comments)     PE/DVT 2022        Social History     Tobacco Use    Smoking status: Former     Current packs/day: 0.00     Average packs/day: 0.5 packs/day for 8.0 years (4.0 ttl pk-yrs)     Types: Cigarettes     Start date: 1997     Quit date: 2005     Years since quittin.0     Passive exposure: Never    Smokeless tobacco: Never   Substance Use Topics    Alcohol use: Yes     Comment: Alcoholic Drinks/day: 2-3 drinks per week     History   Drug Use No             Review of Systems  Constitutional, neuro, ENT, endocrine, pulmonary, cardiac, gastrointestinal, genitourinary, musculoskeletal, integument and psychiatric systems are negative, except as otherwise noted.    Objective    /84   Pulse 75   Temp 97.6  F (36.4  C) (Temporal)   Resp 16   Ht 1.78 m (5' 10.08\")   Wt (!) 137 kg (302 lb)   LMP 2025   SpO2 99%   BMI 43.23 kg/m     Estimated body mass index is 43.23 kg/m  as calculated from the following:    Height as of this encounter: 1.78 m (5' 10.08\").    Weight as of this encounter: 137 kg (302 lb).  Physical Exam  GENERAL: alert and no distress  EYES: Eyes grossly normal to inspection, PERRL and conjunctivae and sclerae normal  HENT: ear canals and TM's normal, nose and mouth without ulcers or lesions  NECK: no adenopathy, no asymmetry, masses, or scars  RESP: lungs clear to auscultation - no rales, rhonchi or wheezes  CV: regular rate and rhythm, normal S1 S2, no S3 or S4, no murmur, click or rub, no peripheral edema  ABDOMEN: soft, nontender, no hepatosplenomegaly, no masses and bowel sounds normal  MS: no gross musculoskeletal defects noted, no edema  SKIN: no suspicious lesions or rashes  NEURO: Normal strength and " tone, mentation intact and speech normal  PSYCH: mentation appears normal, affect normal/bright      Diagnostics  Recent Results (from the past 48 hours)   Hemoglobin    Collection Time: 07/30/25  8:42 AM   Result Value Ref Range    Hemoglobin 12.7 11.7 - 15.7 g/dL    MCV 89 78 - 100 fL   HCG qualitative urine    Collection Time: 07/30/25  8:43 AM   Result Value Ref Range    hCG Urine Qualitative Negative Negative      No EKG required, no history of coronary heart disease, significant arrhythmia, peripheral arterial disease or other structural heart disease.    Revised Cardiac Risk Index (RCRI)  The patient has the following serious cardiovascular risks for perioperative complications:   - No serious cardiac risks = 0 points     RCRI Interpretation: 0 points: Class I (very low risk - 0.4% complication rate)         Signed Electronically by: Corrine Hall PA-C  A copy of this evaluation report is provided to the requesting physician.

## 2025-07-30 NOTE — PATIENT INSTRUCTIONS
Hold phentermine (Qsymia) for 7 days before surgery.  You should be able to restart it the day after surgery.